# Patient Record
Sex: MALE | Race: WHITE | Employment: UNEMPLOYED | URBAN - NONMETROPOLITAN AREA
[De-identification: names, ages, dates, MRNs, and addresses within clinical notes are randomized per-mention and may not be internally consistent; named-entity substitution may affect disease eponyms.]

---

## 2017-11-22 ENCOUNTER — HOSPITAL ENCOUNTER (OUTPATIENT)
Age: 32
Discharge: HOME | End: 2017-11-22
Payer: MEDICAID

## 2017-11-22 LAB
ALBUMIN SERPL-MCNC: 4.8 G/DL (ref 3.5–5.2)
ALBUMIN/GLOBULIN RATIO: 1.5 (ref 1–2.5)
ALP BLD-CCNC: 142 U/L (ref 40–129)
ALT SERPL-CCNC: 26 U/L (ref 5–41)
AST SERPL-CCNC: 19 U/L
BILIRUB SERPL-MCNC: 0.18 MG/DL (ref 0.3–1.2)
BILIRUBIN DIRECT: <0.08 MG/DL
BILIRUBIN, INDIRECT: ABNORMAL MG/DL (ref 0–1)
GLOBULIN: ABNORMAL G/DL (ref 1.5–3.8)
HAV IGM SER IA-ACNC: NONREACTIVE
HBV SURFACE AB TITR SER: 495 MIU/ML
HEPATITIS B CORE IGM ANTIBODY: NONREACTIVE
HEPATITIS B SURFACE ANTIGEN: NONREACTIVE
HEPATITIS C ANTIBODY: REACTIVE
HIV AG/AB: NONREACTIVE
TOTAL PROTEIN: 8 G/DL (ref 6.4–8.3)

## 2017-11-22 PROCEDURE — 87340 HEPATITIS B SURFACE AG IA: CPT

## 2017-11-22 PROCEDURE — 86317 IMMUNOASSAY INFECTIOUS AGENT: CPT

## 2017-11-22 PROCEDURE — 36415 COLL VENOUS BLD VENIPUNCTURE: CPT

## 2017-11-22 PROCEDURE — 87389 HIV-1 AG W/HIV-1&-2 AB AG IA: CPT

## 2017-11-22 PROCEDURE — 86709 HEPATITIS A IGM ANTIBODY: CPT

## 2017-11-22 PROCEDURE — 80076 HEPATIC FUNCTION PANEL: CPT

## 2017-11-22 PROCEDURE — 86803 HEPATITIS C AB TEST: CPT

## 2017-11-22 PROCEDURE — 86705 HEP B CORE ANTIBODY IGM: CPT

## 2018-10-15 ENCOUNTER — HOSPITAL ENCOUNTER (OUTPATIENT)
Age: 33
Discharge: HOME | End: 2018-10-15
Payer: MEDICAID

## 2018-10-15 LAB — HEPATITIS C ANTIBODY: REACTIVE

## 2018-10-15 PROCEDURE — 36415 COLL VENOUS BLD VENIPUNCTURE: CPT

## 2018-10-15 PROCEDURE — 87522 HEPATITIS C REVRS TRNSCRPJ: CPT

## 2018-10-15 PROCEDURE — 86803 HEPATITIS C AB TEST: CPT

## 2018-10-16 LAB
DIRECT EXAM: ABNORMAL
Lab: ABNORMAL
SPECIMEN DESCRIPTION: ABNORMAL
STATUS: ABNORMAL

## 2020-03-10 ENCOUNTER — HOSPITAL ENCOUNTER (OUTPATIENT)
Dept: LAB | Age: 35
Discharge: HOME | End: 2020-03-10
Payer: MEDICAID

## 2020-03-10 LAB
ABSOLUTE EOS #: 0.04 K/UL (ref 0–0.44)
ABSOLUTE IMMATURE GRANULOCYTE: <0.03 K/UL (ref 0–0.3)
ABSOLUTE LYMPH #: 1.34 K/UL (ref 1.1–3.7)
ABSOLUTE MONO #: 0.35 K/UL (ref 0.1–1.2)
ALBUMIN SERPL-MCNC: 4.8 G/DL (ref 3.5–5.2)
ALBUMIN/GLOBULIN RATIO: 1.5 (ref 1–2.5)
ALP BLD-CCNC: 114 U/L (ref 40–129)
ALT SERPL-CCNC: 135 U/L (ref 5–41)
ANION GAP SERPL CALCULATED.3IONS-SCNC: 9 MMOL/L (ref 9–17)
AST SERPL-CCNC: 60 U/L
BASOPHILS # BLD: 0 % (ref 0–2)
BASOPHILS ABSOLUTE: <0.03 K/UL (ref 0–0.2)
BILIRUB SERPL-MCNC: 0.55 MG/DL (ref 0.3–1.2)
BUN BLDV-MCNC: 13 MG/DL (ref 6–20)
BUN/CREAT BLD: 16 (ref 9–20)
CALCIUM SERPL-MCNC: 9.4 MG/DL (ref 8.6–10.4)
CHLORIDE BLD-SCNC: 102 MMOL/L (ref 98–107)
CHOLESTEROL/HDL RATIO: 3.3
CHOLESTEROL: 147 MG/DL
CO2: 27 MMOL/L (ref 20–31)
CREAT SERPL-MCNC: 0.79 MG/DL (ref 0.7–1.2)
DIFFERENTIAL TYPE: ABNORMAL
EOSINOPHILS RELATIVE PERCENT: 1 % (ref 1–4)
GFR AFRICAN AMERICAN: >60 ML/MIN
GFR NON-AFRICAN AMERICAN: >60 ML/MIN
GFR SERPL CREATININE-BSD FRML MDRD: ABNORMAL ML/MIN/{1.73_M2}
GFR SERPL CREATININE-BSD FRML MDRD: ABNORMAL ML/MIN/{1.73_M2}
GLUCOSE BLD-MCNC: 97 MG/DL (ref 70–99)
HAV AB SERPL IA-ACNC: REACTIVE
HBV SURFACE AB TITR SER: 75.13 MIU/ML
HCT VFR BLD CALC: 39.9 % (ref 40.7–50.3)
HDLC SERPL-MCNC: 45 MG/DL
HEMOGLOBIN: 13.8 G/DL (ref 13–17)
HEPATITIS B CORE TOTAL ANTIBODY: REACTIVE
HEPATITIS B SURFACE ANTIGEN: NONREACTIVE
IMMATURE GRANULOCYTES: 0 %
INR BLD: 1.1 (ref 0.9–1.2)
LDL CHOLESTEROL: 89 MG/DL (ref 0–130)
LYMPHOCYTES # BLD: 30 % (ref 24–43)
MCH RBC QN AUTO: 29.7 PG (ref 25.2–33.5)
MCHC RBC AUTO-ENTMCNC: 34.6 G/DL (ref 28.4–34.8)
MCV RBC AUTO: 86 FL (ref 82.6–102.9)
MONOCYTES # BLD: 8 % (ref 3–12)
NRBC AUTOMATED: 0 PER 100 WBC
PDW BLD-RTO: 12.4 % (ref 11.8–14.4)
PLATELET # BLD: 149 K/UL (ref 138–453)
PLATELET ESTIMATE: ABNORMAL
PMV BLD AUTO: 8.7 FL (ref 8.1–13.5)
POTASSIUM SERPL-SCNC: 4.3 MMOL/L (ref 3.7–5.3)
PROTHROMBIN TIME: 10.8 SEC (ref 9.7–12.2)
RBC # BLD: 4.64 M/UL (ref 4.21–5.77)
RBC # BLD: ABNORMAL 10*6/UL
SEG NEUTROPHILS: 61 % (ref 36–65)
SEGMENTED NEUTROPHILS ABSOLUTE COUNT: 2.74 K/UL (ref 1.5–8.1)
SODIUM BLD-SCNC: 138 MMOL/L (ref 135–144)
TOTAL PROTEIN: 7.9 G/DL (ref 6.4–8.3)
TRIGL SERPL-MCNC: 66 MG/DL
TSH SERPL DL<=0.05 MIU/L-ACNC: 0.8 MIU/L (ref 0.3–5)
VLDLC SERPL CALC-MCNC: NORMAL MG/DL (ref 1–30)
WBC # BLD: 4.5 K/UL (ref 3.5–11.3)
WBC # BLD: ABNORMAL 10*3/UL

## 2020-03-10 PROCEDURE — 36415 COLL VENOUS BLD VENIPUNCTURE: CPT

## 2020-03-10 PROCEDURE — 85025 COMPLETE CBC W/AUTO DIFF WBC: CPT

## 2020-03-10 PROCEDURE — 87340 HEPATITIS B SURFACE AG IA: CPT

## 2020-03-10 PROCEDURE — 86317 IMMUNOASSAY INFECTIOUS AGENT: CPT

## 2020-03-10 PROCEDURE — 87389 HIV-1 AG W/HIV-1&-2 AB AG IA: CPT

## 2020-03-10 PROCEDURE — 87902 NFCT AGT GNTYP ALYS HEP C: CPT

## 2020-03-10 PROCEDURE — 86708 HEPATITIS A ANTIBODY: CPT

## 2020-03-10 PROCEDURE — 86704 HEP B CORE ANTIBODY TOTAL: CPT

## 2020-03-10 PROCEDURE — 80053 COMPREHEN METABOLIC PANEL: CPT

## 2020-03-10 PROCEDURE — 84443 ASSAY THYROID STIM HORMONE: CPT

## 2020-03-10 PROCEDURE — 85610 PROTHROMBIN TIME: CPT

## 2020-03-10 PROCEDURE — 80061 LIPID PANEL: CPT

## 2020-03-10 PROCEDURE — 87522 HEPATITIS C REVRS TRNSCRPJ: CPT

## 2020-03-12 LAB
DIRECT EXAM: ABNORMAL
DIRECT EXAM: ABNORMAL
Lab: ABNORMAL
SPECIMEN DESCRIPTION: ABNORMAL

## 2020-03-13 LAB
HCV QUANTITATIVE: NORMAL
HEPATITIS C GENOTYPE: NORMAL
HIV AG/AB: NONREACTIVE

## 2020-05-26 ENCOUNTER — HOSPITAL ENCOUNTER (OUTPATIENT)
Age: 35
Discharge: HOME | End: 2020-05-26
Payer: MEDICAID

## 2020-05-26 PROCEDURE — 82977 ASSAY OF GGT: CPT

## 2020-05-26 PROCEDURE — 84460 ALANINE AMINO (ALT) (SGPT): CPT

## 2020-05-26 PROCEDURE — 83883 ASSAY NEPHELOMETRY NOT SPEC: CPT

## 2020-05-26 PROCEDURE — 84450 TRANSFERASE (AST) (SGOT): CPT

## 2020-05-26 PROCEDURE — 36415 COLL VENOUS BLD VENIPUNCTURE: CPT

## 2020-05-26 PROCEDURE — 84520 ASSAY OF UREA NITROGEN: CPT

## 2020-05-28 LAB
ALANINE AMINOTRANSFERASE, FIBROMETER: 163 U/L (ref 5–50)
ALPHA-2-MACROGLOBULIN, FIBROMETER: 123 MG/DL (ref 131–293)
ASPARTATE AMINOTRANSFERASE, FIBROMETER: 77 U/L (ref 9–50)
CIRRHOMETER PATIENT SCORE: 0.03
EER FIBROMETER REPORT: ABNORMAL
FIBROMETER INTERPRETATION: ABNORMAL
FIBROMETER PATIENT SCORE: 0.39
FIBROMETER PLATELET COUNT: 133
FIBROMETER PROTHROMBIN INDEX: 91 % (ref 90–120)
FIBROSIS METAVIR CLASSIFICATION: ABNORMAL
GAMMA GLUTAMYL TRANSFERASE, FIBROMETER: 21 U/L (ref 7–51)
INFLAMETER METAVIR CLASSIFICATION: ABNORMAL
INFLAMETER PATIENT SCORE: 0.67
UREA NITROGEN, FIBROMETER: 16 MG/DL (ref 7–20)

## 2020-06-18 ENCOUNTER — OFFICE VISIT (OUTPATIENT)
Dept: OTOLARYNGOLOGY | Age: 35
End: 2020-06-18
Payer: MEDICAID

## 2020-06-18 VITALS
TEMPERATURE: 98.4 F | HEART RATE: 71 BPM | HEIGHT: 66 IN | BODY MASS INDEX: 27 KG/M2 | DIASTOLIC BLOOD PRESSURE: 78 MMHG | WEIGHT: 168 LBS | SYSTOLIC BLOOD PRESSURE: 116 MMHG

## 2020-06-18 PROBLEM — R42 DIZZINESS: Status: ACTIVE | Noted: 2020-06-18

## 2020-06-18 PROBLEM — H93.8X2 PRESSURE SENSATION IN LEFT EAR: Status: ACTIVE | Noted: 2020-06-18

## 2020-06-18 PROBLEM — R42 LIGHTHEADEDNESS: Status: ACTIVE | Noted: 2020-06-18

## 2020-06-18 PROCEDURE — 99204 OFFICE O/P NEW MOD 45 MIN: CPT | Performed by: PHYSICIAN ASSISTANT

## 2020-06-18 PROCEDURE — 1036F TOBACCO NON-USER: CPT | Performed by: PHYSICIAN ASSISTANT

## 2020-06-18 PROCEDURE — G8427 DOCREV CUR MEDS BY ELIG CLIN: HCPCS | Performed by: PHYSICIAN ASSISTANT

## 2020-06-18 PROCEDURE — G8419 CALC BMI OUT NRM PARAM NOF/U: HCPCS | Performed by: PHYSICIAN ASSISTANT

## 2020-06-18 RX ORDER — BUPRENORPHINE AND NALOXONE 12; 3 MG/1; MG/1
1 FILM, SOLUBLE BUCCAL; SUBLINGUAL DAILY
COMMUNITY
Start: 2020-03-05

## 2020-06-18 RX ORDER — ESCITALOPRAM OXALATE 20 MG/1
20 TABLET ORAL DAILY
COMMUNITY
Start: 2020-06-15

## 2020-06-18 RX ORDER — BUPROPION HYDROCHLORIDE 150 MG/1
TABLET, EXTENDED RELEASE ORAL
COMMUNITY
Start: 2020-03-05 | End: 2020-11-11 | Stop reason: SDUPTHER

## 2020-06-18 ASSESSMENT — ENCOUNTER SYMPTOMS
APNEA: 0
SINUS PAIN: 0
RECTAL PAIN: 0
SINUS PRESSURE: 0
DIARRHEA: 0
SHORTNESS OF BREATH: 0
SORE THROAT: 0
NAUSEA: 0
EYE ITCHING: 0
EYE DISCHARGE: 0
BACK PAIN: 0
FACIAL SWELLING: 0
ABDOMINAL PAIN: 0
CONSTIPATION: 0
EYE PAIN: 0
WHEEZING: 0
CHOKING: 0
ANAL BLEEDING: 0
TROUBLE SWALLOWING: 0
VOICE CHANGE: 0
EYE REDNESS: 0
RHINORRHEA: 0
BLOOD IN STOOL: 0
ABDOMINAL DISTENTION: 0
PHOTOPHOBIA: 0
STRIDOR: 0
VOMITING: 0
COLOR CHANGE: 0
COUGH: 0
CHEST TIGHTNESS: 0

## 2020-06-18 NOTE — PROGRESS NOTES
[facility] [address] [place] [facility] OF [facility]  100 [facility]. [address] [place] [address]  Dept: [phone]   HANNAH Syed MD (supervising physician)    Fatimah Jennings 30 y.o. male     Patient presents with a chief complaint of Dizziness (States, \"everytime I move too fast I get really dizzy. \"  Denies nausea or vomiting and no syncope.)       /78 (Site: Left Upper Arm, Position: Sitting, Cuff Size: Medium Adult)   Pulse 71   Temp 98.4 °F (36.9 °C) (Temporal)   Ht 5' 6\" (1.676 m)   Wt 168 lb (76.2 kg)   BMI 27.12 kg/m²       History of Presenting Illness: The patient/caregiver reports a history of complaint with the following features: Onset/Quality:  Onset a \"couple months\" ago. Clarifies as March 2020. Denies illness or injury or change in medications at that time. Hasn't had anything like this occur before. When turns head quickly (says he thinks or guesses just his head versus body and head movement). Says almost like you \"snap blackout\" and also described as a quick lightheaded spell. Lasts 1-2 seconds. Doesn't matter the direction that he moves his head. Can occur with any direction. He also notes it happens when standing and not when sitting. Spinning/vertigo? Pt says \"um it's kind of like that but I don't know if I would describe it like that\". Sometimes feels like something in left ear as well. Doesn't necessarily correlate with the dizziness. Doesn't seem to be related to the dizziness per Pt. Ear pressure lasts x 1 hour. Occurs almost daily, but not daily. Occurs most days. Timing:   No worse during a particular time of day. Always when standing up and doesn't seem to occur if sitting. No problems rolling over in bed. Frequency:  Several times a day. Duration:  The dizziness/lightheadedness lasts for 1-2 seconds. Severity:   Says \"not a huge deal\". \"Just irritating\".   No associated taking his time to move. Aggravating factors:  Quick head movements but has to be standing up and not sitting. Doesn't seem to happen when sitting. Review of systems covering 10 systems is reviewed as staff entry in other note and pertinent positives and negatives noted. History reviewed. No pertinent past medical history.     Current Outpatient Medications:     escitalopram (LEXAPRO) 20 MG tablet, , Disp: , Rfl:     buprenorphine-naloxone (SUBOXONE) 12-3 MG sublingual film, Buprenorphine / Naloxone Suboxone 12-3 MG Sublingual Film 03/05/2020 Provider: 03-  [facility] 87 ([id number]), Disp: , Rfl:     buPROPion (WELLBUTRIN SR) 150 MG extended release tablet, buPROPion Wellbutrin  MG Oral Tablet Extended Release 12 Hour 03/05/2020 Provider: 03-  [facility] 87 ([id number]), Disp: , Rfl:    No Known Allergies   Past Surgical History:   Procedure Laterality Date    ADENOIDECTOMY        Social History     Socioeconomic History    Marital status: Single     Spouse name: Not on file    Number of children: Not on file    Years of education: Not on file    Highest education level: Not on file   Occupational History    Not on file   Social Needs    Financial resource strain: Not on file    Food insecurity     Worry: Not on file     Inability: Not on file    Transportation needs     Medical: Not on file     Non-medical: Not on file   Tobacco Use    Smoking status: Former Smoker     Packs/day: 1.00    Smokeless tobacco: Never Used   Substance and Sexual Activity    Alcohol use: No    Drug use: Yes     Comment: suboxone, speed    Sexual activity: Yes     Partners: Female   Lifestyle    Physical activity     Days per week: Not on file     Minutes per session: Not on file    Stress: Not on file   Relationships    Social connections     Talks on phone: Not on file     Gets together: Not on file     Attends [employer] service: Not on file     Active member of discharge    Right Tympanic Membrane: normal landmarks, translucent, mobile to pneumatic otoscopy, no perforation, no effusion, no redness or injection, TM not bulging or retracted, middle ear space appears adequately ventilated    Left Tympanic Membrane: normal landmarks, translucent, mobile to pneumatic otoscopy, no perforation, no effusion, no redness or injection, TM not bulging or retracted, middle ear space appears adequately ventilated    Hearing: intact to spoken voice, intact to finger rub bilaterally, Snider lateralizes to the left, Right Ear: Rinne AC>BC, Left Ear: Rinne AC>BC    NOSE:    Nasal Skin: no lesions, no lacerations, no scars    Nasal Dorsum: symmetric with no visible or palpable deformities    Nasal Tip: normal symmetric nasal tip, normal nasal valves    Nasal Mucosa: normal, pink and moist, no polyps, no drainage    Septum:  appears bowed to the right when viewed from left nasal cavity but with prominent ledge jutting out on left septum, no exposed vessels, no bleeding, no septal granuloma, no perforation    Turbinates: normal size and conformation, no swelling    ORAL CAVITY/MOUTH:    Lips, teeth, gums: normal lips, normal gums, dentition intact with some decay of bilateral lower molars    Oral Mucosa: normal, moist, no lesions    Palate: normal hard palate, normal soft palate, symmetric palatal elevation    Floor of Mouth: normal floor of mouth    Tongue: normal tongue, no lesions, no edema, no masses, normal mucosa, mobile    Tonsils:  1+ bilateral tonsils, symmetrical, normal appearing, no masses or lesions, no exudate, no redness    Posterior pharynx: normally formed, some redness in areas, no exudate, no masses or lesions    NECK:    Neck: no masses, trachea midline, normal range of motion, no cysts or pits, no tenderness to palpation  Full AROM with neck extension  Full AROM with neck flexion  Full AROM with neck rotation bilaterally  Full AROM with neck with lateral flexion to time, oriented to place, oriented to person, oriented to situation  Thought processes logical    Cranial Nerves: Cranial Nerves II-XII intact, normal facial movement, normal shoulder shrug (against resistance)    Normal gait, not ataxic or antalgic  Essentially unremarkable tandem gait. No problems with tandem walk except with one step but no significant displacement of feet or notable/significant imbalance. Negative Romberg test.  Heel up and down shin no problems bilaterally. Normal finger-to-nose pointing bilaterally with eyes open. With eyes closed when finger-to-nose pointing with right hand was on target most times, but sometimes just off to the side of target but not by much. Later repeated this and no problems and was on target. No problems when using left hand with finger-to-nose pointing with eyes closed. Finger-to-nose with moving target - no difficulty bilaterally. Vestibulocular: Issa-Hallpike testing negative for symptoms or nystagmus, no ocular pursuit defects, and no head-shaking test induced nystagmus  Fukuda step test essentially normal - no rotation with 30 steps, on the 6th step Pt with mild imbalance backwards but continued to complete 30 steps without further imbalance    PSYCHIATRIC:    Mood and affect:  Affect appropriate for situation/setting. Assessment and Plan:  I have advised the patient and/or caregiver that the symptoms and exam findings are most consistent with dizziness of uncertain cause. We have discussed that the symptoms can arise from abnormalities of the balance organ of the ear, vision impairment, poor coordination of movements by the brain, weakness or reduced sensation of the extremities, or other causes related to metabolic abnormalities or circulatory problems. We have discussed that other evaluation may be needed and that ongoing follow-up is recommended.   The patient and/or caregiver is to notify the office if no improvement or worsening of

## 2020-06-19 PROBLEM — J34.2 DEVIATED NASAL SEPTUM: Status: ACTIVE | Noted: 2020-06-19

## 2020-06-19 PROBLEM — H93.12 TINNITUS OF LEFT EAR: Status: ACTIVE | Noted: 2020-06-19

## 2020-07-09 ENCOUNTER — OFFICE VISIT (OUTPATIENT)
Dept: OTOLARYNGOLOGY | Age: 35
End: 2020-07-09
Payer: MEDICAID

## 2020-07-09 VITALS
HEART RATE: 76 BPM | WEIGHT: 168.1 LBS | OXYGEN SATURATION: 98 % | RESPIRATION RATE: 16 BRPM | DIASTOLIC BLOOD PRESSURE: 77 MMHG | BODY MASS INDEX: 27.02 KG/M2 | SYSTOLIC BLOOD PRESSURE: 134 MMHG | HEIGHT: 66 IN | TEMPERATURE: 97.4 F

## 2020-07-09 PROCEDURE — G8428 CUR MEDS NOT DOCUMENT: HCPCS | Performed by: PHYSICIAN ASSISTANT

## 2020-07-09 PROCEDURE — 1036F TOBACCO NON-USER: CPT | Performed by: PHYSICIAN ASSISTANT

## 2020-07-09 PROCEDURE — G8419 CALC BMI OUT NRM PARAM NOF/U: HCPCS | Performed by: PHYSICIAN ASSISTANT

## 2020-07-09 PROCEDURE — 99213 OFFICE O/P EST LOW 20 MIN: CPT | Performed by: PHYSICIAN ASSISTANT

## 2020-07-09 ASSESSMENT — ENCOUNTER SYMPTOMS
CHEST TIGHTNESS: 0
COUGH: 0
VOMITING: 0
SINUS PRESSURE: 0
EYE ITCHING: 0
ABDOMINAL DISTENTION: 0
TROUBLE SWALLOWING: 0
EYE PAIN: 0
COLOR CHANGE: 0
CHOKING: 0
PHOTOPHOBIA: 0
WHEEZING: 0
NAUSEA: 0
STRIDOR: 0
CONSTIPATION: 0
SORE THROAT: 0
VOICE CHANGE: 0
RHINORRHEA: 0
BACK PAIN: 0
EYE REDNESS: 0
ANAL BLEEDING: 0
APNEA: 0
BLOOD IN STOOL: 0
ABDOMINAL PAIN: 0
FACIAL SWELLING: 0
RECTAL PAIN: 0
EYE DISCHARGE: 0
SINUS PAIN: 0
SHORTNESS OF BREATH: 0
DIARRHEA: 0

## 2020-07-09 NOTE — PROGRESS NOTES
[facility] [address] [facility] PART OF [facility]  100 [facility]. [address] [place] [address]  Dept: [phone]   HANNAH Huff MD (supervising physician)    Brandy Brock 30 y.o. male     Patient presents with a chief complaint of Dizziness (States, \"nothing has changed I still have dizziness mostly when sitting upright and I have random pressure in my head on the left side. \")       /77 (Site: Left Upper Arm, Position: Sitting, Cuff Size: Medium Adult)   Pulse 76   Temp 97.4 °F (36.3 °C) (Infrared)   Resp 16   Ht 5' 6\" (1.676 m)   Wt 168 lb 1.6 oz (76.2 kg)   SpO2 98%   BMI 27.13 kg/m²            Orthostatic measurements from today:  Lying for 5 minutes  Right arm  /80  HR 68-69    Standing at 1 minute  Right arm  /77  HR 76  Denied symptoms. Standing at 3 minutes  Right arm  /76  HR 82  Denied symptoms. HPI from today:  Pt is here to follow-up after seeing audiologist for formal hearing evaluation. Records of this testing are in the \"Media\" section of EMR and no abnormality on that testing (normal audiometry, normal tympanometry, normal acoustic reflexes, no acoustic reflex decay, excellent speech discrimination bilaterally). Denies changes since last visit. Nothing better or worse and denies new symptoms. HPI from last visit reviewed with Pt. No dizziness with sitting, just standing. Episodes of the dizziness daily. Duration of episodes is 1-2 seconds. Quality of dizziness episodes is described as an \"instant loss of equilibrium\" and also as lightheadedness. Denies spinning/vertigo. Denies LOC. Denies loss of vision or vision going black. Denies chest pain, racing heart, too slow of a heart rate, and heart palpitations. Denies neck pain and neck stiffness. Denies neck problems in general.    Sometimes feels like there is something in the left ear. Doesn't feel this way currently.   Nothing makes this worse/brings it on. Denies ear pain, but Hx of \"earaches\". Last ear ache last summer. Denies ear drainage. Denies jaw pain or crepitus. At last visit reported clenching of teeth. No associated falls. No problems driving. Normal TSH and no anemia on labs from March 2020. No personal Hx of seizures. No family Hx of seizures. Denies familial heart disease. Family medical Hx reviewed with Pt. Mom - scoliosis. Dad - healthy. 4 half-siblings. No medical problems that Pt knows of for his half-siblings. Paternal GM - emphysema, smoker. Paternal GF - not known. Maternal GM - dementia  Maternal GF - healthy. History of Presenting Illness from last visit on 6/18/20 (was a new Pt at that time): The patient/caregiver reports a history of complaint with the following features: Onset/Quality:  Onset a \"couple months\" ago. Clarifies as March 2020. Denies illness or injury or change in medications at that time. Hasn't had anything like this occur before. When turns head quickly (says he thinks or guesses just his head versus body and head movement). Says almost like you \"snap blackout\" and also described as a quick lightheaded spell. Lasts 1-2 seconds. Doesn't matter the direction that he moves his head. Can occur with any direction. He also notes it happens when standing and not when sitting. Spinning/vertigo? Pt says \"um it's kind of like that but I don't know if I would describe it like that\". Sometimes feels like something in left ear as well. Doesn't necessarily correlate with the dizziness. Doesn't seem to be related to the dizziness per Pt. Ear pressure lasts x 1 hour. Occurs almost daily, but not daily. Occurs most days. Timing:   No worse during a particular time of day. Always when standing up and doesn't seem to occur if sitting. No problems rolling over in bed. Frequency:  Several times a day.   Duration:  The dizziness/lightheadedness lasts meclizine. Took x 2 weeks with no change. Alleviating factors:  Not moving too quickly and taking his time to move. Aggravating factors:  Quick head movements but has to be standing up and not sitting. Doesn't seem to happen when sitting. End of HPI from 6/18/20    Review of systems covering 10 systems is reviewed as staff entry in other note and pertinent positives and negatives noted. History reviewed. No pertinent past medical history. Current Outpatient Medications   Medication Sig Dispense Refill    escitalopram (LEXAPRO) 20 MG tablet       buprenorphine-naloxone (SUBOXONE) 12-3 MG sublingual film Buprenorphine / Naloxone Suboxone 12-3 MG Sublingual Film 03/05/2020 Provider: 03-  [facility] 87 ([id number])      buPROPion [facility] SR) 150 MG extended release tablet buPROPion Wellbutrin  MG Oral Tablet Extended Release 12 Hour 03/05/2020 Provider: 03-  [facility] 87 ([id number])       No current facility-administered medications for this visit.           No Known Allergies   Past Surgical History:   Procedure Laterality Date    ADENOIDECTOMY        Social History     Socioeconomic History    Marital status: Single     Spouse name: Not on file    Number of children: Not on file    Years of education: Not on file    Highest education level: Not on file   Occupational History    Not on file   Social Needs    Financial resource strain: Not on file    Food insecurity     Worry: Not on file     Inability: Not on file    Transportation needs     Medical: Not on file     Non-medical: Not on file   Tobacco Use    Smoking status: Former Smoker     Packs/day: 1.00    Smokeless tobacco: Never Used   Substance and Sexual Activity    Alcohol use: No    Drug use: Yes     Comment: suboxone, speed    Sexual activity: Yes     Partners: Female   Lifestyle    Physical activity     Days per week: Not on file     Minutes per session: Not on file    Stress: Not on file   Relationships    Social connections     Talks on phone: Not on file     Gets together: Not on file     Attends [employer] service: Not on file     Active member of club or organization: Not on file     Attends meetings of clubs or organizations: Not on file     Relationship status: Not on file    Intimate partner violence     Fear of current or ex partner: Not on file     Emotionally abused: Not on file     Physically abused: Not on file     Forced sexual activity: Not on file   Other Topics Concern    Not on file   Social History Narrative    Not on file     History reviewed. No pertinent family history.      PHYSICAL EXAM:    The patient was examined today 7/09/2020 with findings as follows:    CONSTITUTIONAL:    General Appearance: well-appearing, nontoxic, alert, no acute distress     Communication: understanding at normal conversational tones, normal voicing, speech intelligible    HEAD/FACE:    Head: atraumatic, normocephalic, no lesions    Facial Inspection: no lesions, healthy skin    Facial Strength: motor strength normal, symmetric strength, symmetric movement    Sinuses: no sinus tenderness    Salivary Glands: no enlargement of parotid glands, no tenderness of parotid glands, no masses of parotid glands, no enlargement of submandibular glands, no tenderness of submandibular glands, no masses of submandibular glands    Temporomandibular Joint: no crepitus with motion, no tenderness on palpation, no trismus, motion symmetric    EYES:  PERRL, extra-ocular movements intact, no nystagmus, sclera white, no redness of eyes, no watering of eyes    EARS:    Bilateral External Ears: no pits, no tags    Right External Ear: normally formed, no lesions; no mastoid tenderness, redness or swelling    Left External Ear: normally formed, no lesions; no mastoid tenderness, redness or swelling    Right External Auditory Canal: normally formed, no redness, no swelling, no lesions, healthy skin, no obstructing enlargement    RESPIRATORY:    Inspection/Auscultation: good air movement (anteriorly, posteriorly, laterally), chest expands symmetrically, normal breath sounds, no wheezing, no stridor, no rhonchi, no crackles    CARDIOVASCULAR SYSTEM:  Heart regular rate and rhythm, normal S1 and S2, no m/r/g  No carotid thrills, no carotid bruits, no JVD    Observation/Palpation of Peripheral Vascular System: no varicosities, no cyanosis, no edema    MUSCULOSKELETAL SYSTEM:    Musculoskeletal System:  Moves all 4 extremities. No gross motor deficit or gross deformity of extremities. Ambulates on own. Gait is not ataxic or antalgic. SKIN:    General Appearance: no rash of exposed skin, warm and dry, no bruising or petechiae/purpura of exposed skin    NEUROLOGICAL SYSTEM:    Orientation: oriented to time, oriented to place, oriented to person, oriented to situation  Thought processes logical    Cranial Nerves: Cranial Nerves II-XII intact, normal facial movement    PSYCHIATRIC:    Mood and affect:  Affect appropriate for situation/setting. Assessment and Plan:  Unsure of cause of Pt's symptoms. Possible orthostatic hypotension or other autonomic dysfunction. Symptoms only with standing per Pt. His \"dizziness\" complaints are likely not related to his ear/vestibular system. Considered and discussed that CNS imaging, ECG, tilt table testing, referral to cardiologist, cardiac monitoring, referral to neurologist, and EEG may be indicated. I initiated an order for tilt table testing today. Pt also advised to follow back up with PCP and that I will call with tilt table testing results. Discussed aiming for at least 2 liters of water or low calorie fluid per day. Discussed considering trial of compression stockings. Pt indicates he does eat salty foods and believes he is getting adequate salt intake. Believes he is getting at least 2000 mg a day.     Unsure of the etiology of the intermittent left ear pressure complaints. Will continue to monitor. Normal recent audiology evaluation. Pt denies having active ear fullness/pressure while at today's visit. Considered symptoms may be referred from elsewhere to ear and discussed this with Pt. Pt has reported clenching of teeth and considered referred from the TMJ. Denies jaw pain and TMJ crepitus and exam of this area unremarkable today. Follow-up in 3 months. Differential:  Autonomic dysfunction (orthostatic hypotension, POTS), hypovolemia, cardiac dysrhythmia, medication side effect, adrenal insufficiency    The patient and/or caregiver is to notify the office if no improvement or worsening of symptoms is noted prior to the scheduled follow-up for sooner evaluation. The patient and/or caregiver is able to state an understanding of these recommendations and is agreeable to the treatment plan. Diagnosis Orders   1. Dizziness  Tilt Table Test   2. Lightheadedness  Tilt Table Test   3. Orthostatic hypotension  Tilt Table Test    Possible orthostatic hypotension. At 1 min after standing SBP drop of > 20 mmHg, but at 3 minutes SBP about same as it was when lying. 4. Pressure sensation in left ear     5. Tinnitus of left ear        Return in about 3 months (around 10/9/2020).

## 2020-07-15 ENCOUNTER — HOSPITAL ENCOUNTER (EMERGENCY)
Age: 35
Discharge: HOME | End: 2020-07-15
Payer: MEDICAID

## 2020-07-15 ENCOUNTER — HOSPITAL ENCOUNTER (OUTPATIENT)
Dept: NON INVASIVE DIAGNOSTICS | Age: 35
Discharge: HOME | End: 2020-07-15
Payer: MEDICAID

## 2020-07-15 PROCEDURE — 99282 EMERGENCY DEPT VISIT SF MDM: CPT

## 2020-07-15 RX ORDER — NAPROXEN 500 MG/1
500 TABLET ORAL 2 TIMES DAILY
Qty: 14 TABLET | Refills: 0 | Status: SHIPPED | OUTPATIENT
Start: 2020-07-15 | End: 2020-09-21

## 2020-07-15 RX ORDER — MECLIZINE HYDROCHLORIDE 25 MG/1
25 TABLET ORAL 3 TIMES DAILY PRN
Qty: 15 TABLET | Refills: 0 | Status: SHIPPED | OUTPATIENT
Start: 2020-07-15 | End: 2020-07-25

## 2020-07-15 ASSESSMENT — PAIN DESCRIPTION - ORIENTATION: ORIENTATION: LEFT

## 2020-07-15 ASSESSMENT — PAIN SCALES - GENERAL: PAINLEVEL_OUTOF10: 7

## 2020-07-15 ASSESSMENT — PAIN DESCRIPTION - DESCRIPTORS: DESCRIPTORS: PRESSURE;SHARP

## 2020-07-15 ASSESSMENT — PAIN DESCRIPTION - LOCATION: LOCATION: EAR

## 2020-07-15 NOTE — PROGRESS NOTES
Six attempts made by myself and Lucina Ramirez RN  to gain IV access. Pt. Relates he is was an IV drug abuser for many years and it's rare to get an IV in now. Unable to do tilt test today due to no IV access. Patient verb. Understanding.

## 2020-07-15 NOTE — ED PROVIDER NOTES
[facility] ED  EMERGENCY DEPARTMENT ENCOUNTER      Pt Name: Xena Moore  MRN: [id number]  [place] 1985  Date of evaluation: 7/15/2020  Provider: Hermila Webster PA-C    CHIEF COMPLAINT       Chief Complaint   Patient presents with    Otalgia     Left ear pain, reports being seen by ENT recently. States began as equilibrium issue, pain onset was this morning. HISTORY OF PRESENT ILLNESS    Xena Moore is a 30 y.o. male who presents to the emergency department from home with pain in his left ear and equilibrium issues going on for the past several months. He has seen ENT twice for ear pressure and intermittent episodes of dizziness he states sometimes when he moves his head he feels like everything shifts or moves. This could not be reproduced at the ENTs office, he stated that they were going to refer him for possible imaging or neurology referral but they have not done this yet. He was supposed to get a tilt table test here at the hospital but could not have it done today so he came to the emergency department stating the pressure has been in his ear is becoming more intense and painful. Triage notes and Nursing notes were reviewed by myself. Any discrepancies are addressed above. PAST MEDICAL HISTORY   History reviewed. No pertinent past medical history.     SURGICAL HISTORY       Past Surgical History:   Procedure Laterality Date    ADENOIDECTOMY         CURRENT MEDICATIONS       Discharge Medication List as of 7/15/2020  1:14 PM      CONTINUE these medications which have NOT CHANGED    Details   escitalopram (LEXAPRO) 20 MG tablet Historical Med      buprenorphine-naloxone (SUBOXONE) 12-3 MG sublingual film Buprenorphine / Naloxone Suboxone 12-3 MG Sublingual Film 03/05/2020 Provider: 03-  [facility] 87 ([id number])Historical Med      buPROPion [facility] SR) 150 MG extended release tablet buPROPion Wellbutrin  MG Oral Tablet Extended Release 12 Hour 03/05/2020 Provider: 03-  [facility] 87 ([id number])Historical Med             ALLERGIES     Patient has no known allergies. FAMILY HISTORY     History reviewed. No pertinent family history. SOCIAL HISTORY       Social History     Socioeconomic History    Marital status: Single     Spouse name: None    Number of children: None    Years of education: None    Highest education level: None   Occupational History    None   Social Needs    Financial resource strain: None    Food insecurity     Worry: None     Inability: None    Transportation needs     Medical: None     Non-medical: None   Tobacco Use    Smoking status: Former Smoker     Packs/day: 1.00    Smokeless tobacco: Never Used   Substance and Sexual Activity    Alcohol use: No    Drug use: Yes     Comment: suboxone, speed    Sexual activity: Yes     Partners: Female   Lifestyle    Physical activity     Days per week: None     Minutes per session: None    Stress: None   Relationships    Social connections     Talks on phone: None     Gets together: None     Attends Jewish service: None     Active member of club or organization: None     Attends meetings of clubs or organizations: None     Relationship status: None    Intimate partner violence     Fear of current or ex partner: None     Emotionally abused: None     Physically abused: None     Forced sexual activity: None   Other Topics Concern    None   Social History Narrative    None       REVIEW OF SYSTEMS     Review of Systems  Except as noted above the remainder of the review of systems was reviewed and is negative.      SCREENINGS    [place] Coma Scale  Eye Opening: Spontaneous  Best Verbal Response: Oriented  Best Motor Response: Obeys commands  [place] Coma Scale Score: 15      PHYSICAL EXAM    (up to 7 for level 4, 8 or more for level 5)     ED Triage Vitals   BP Temp Temp src Pulse Resp SpO2 Height Weight   -- -- -- -- -- -- -- -- Physical Exam  Active and oriented ×3. Nontoxic. No acute distress. Well-hydrated. Head is atraumatic, facies symmetrical.  Neck is supple without adenopathy. Right ear canal and tympanic membrane are unremarkable. Left external auditory canal shows no inflammation or sign of injury, there is a small serous effusion noted on the left tympanic membrane but no erythema noted. Respirations nonlabored. Skin free of any obvious rashes or lesions. Extremities without edema. No acute neurologic deficit noted, range of motion of extremities intact without weakness, good gait and balance clear speech  Good affect. Pleasant patient. DIAGNOSTIC RESULTS       EMERGENCY DEPARTMENT COURSE andMedical Decision Making:       MDM/   I do review the ENTs notes from the 2 visits. We discussed the possibility of steroids with the patient he states he has been on those for this problem and they did not help standing was also on nasal spray that did not help and has had antihistamines as well as decongestants and it only seems to be getting worse. He states the pressure and pain is worse and he is not due to see the ENT for a follow-up visit for a month. He states they were talking about neurology referral due to the fact that he gets the intermittent dizziness but he is neurologically intact at this time I do offer the patient referral to which she agrees. We will also try a short course of meclizine as he does have a serous effusion and description which would be consistent with vertigo as only occasionally moving his head causing the dizziness.     I do note that the patient had a tilt table test ordered today and I asked him about this he states he can get it done because he can start an IV due to his history of previous IV drug abuse while he has not used drugs in several years he states his veins are very bad and they could not get the IV to do the test.    Strict return precautions and follow up

## 2020-07-16 ENCOUNTER — OFFICE VISIT (OUTPATIENT)
Dept: OTOLARYNGOLOGY | Age: 35
End: 2020-07-16
Payer: MEDICAID

## 2020-07-16 VITALS
HEIGHT: 66 IN | DIASTOLIC BLOOD PRESSURE: 77 MMHG | HEART RATE: 67 BPM | BODY MASS INDEX: 27 KG/M2 | TEMPERATURE: 97.7 F | OXYGEN SATURATION: 97 % | RESPIRATION RATE: 16 BRPM | SYSTOLIC BLOOD PRESSURE: 107 MMHG | WEIGHT: 168 LBS

## 2020-07-16 PROCEDURE — G8427 DOCREV CUR MEDS BY ELIG CLIN: HCPCS | Performed by: PHYSICIAN ASSISTANT

## 2020-07-16 PROCEDURE — 99213 OFFICE O/P EST LOW 20 MIN: CPT | Performed by: PHYSICIAN ASSISTANT

## 2020-07-16 PROCEDURE — 1036F TOBACCO NON-USER: CPT | Performed by: PHYSICIAN ASSISTANT

## 2020-07-16 PROCEDURE — G8419 CALC BMI OUT NRM PARAM NOF/U: HCPCS | Performed by: PHYSICIAN ASSISTANT

## 2020-07-16 ASSESSMENT — ENCOUNTER SYMPTOMS
BLOOD IN STOOL: 0
EYE ITCHING: 0
CONSTIPATION: 0
EYE REDNESS: 0
BACK PAIN: 0
ABDOMINAL PAIN: 0
ABDOMINAL DISTENTION: 0
PHOTOPHOBIA: 0
DIARRHEA: 0
VOMITING: 0
FACIAL SWELLING: 0
COLOR CHANGE: 0
CHEST TIGHTNESS: 0
SORE THROAT: 0
STRIDOR: 0
TROUBLE SWALLOWING: 0
EYE DISCHARGE: 0
COUGH: 0
SINUS PRESSURE: 0
RECTAL PAIN: 0
VOICE CHANGE: 0
SHORTNESS OF BREATH: 0
ANAL BLEEDING: 0
EYE PAIN: 0
SINUS PAIN: 0
WHEEZING: 0
NAUSEA: 0
CHOKING: 0
RHINORRHEA: 0
APNEA: 0
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
## 2020-07-16 NOTE — PROGRESS NOTES
Review of Systems   Constitutional: Negative for activity change, appetite change, chills, diaphoresis, fatigue, fever and unexpected weight change. HENT: Positive for ear pain and tinnitus. Negative for congestion, dental problem, drooling, ear discharge, facial swelling, hearing loss, mouth sores, nosebleeds, postnasal drip, rhinorrhea, sinus pressure, sinus pain, sneezing, sore throat, trouble swallowing and voice change. Eyes: Negative for photophobia, pain, discharge, redness, itching and visual disturbance. Respiratory: Negative for apnea, cough, choking, chest tightness, shortness of breath, wheezing and stridor. Cardiovascular: Negative for chest pain, palpitations and leg swelling. Gastrointestinal: Negative for abdominal distention, abdominal pain, anal bleeding, blood in stool, constipation, diarrhea, nausea, rectal pain and vomiting. Endocrine: Negative for cold intolerance, heat intolerance, polydipsia, polyphagia and polyuria. Genitourinary: Negative for decreased urine volume, difficulty urinating, discharge, dysuria, enuresis, flank pain, frequency, genital sores, hematuria, penile pain, penile swelling, scrotal swelling, testicular pain and urgency. Musculoskeletal: Negative for arthralgias, back pain, gait problem, joint swelling, myalgias, neck pain and neck stiffness. Skin: Negative for color change, pallor, rash and wound. Allergic/Immunologic: Negative for environmental allergies, food allergies and immunocompromised state. Neurological: Positive for dizziness. Negative for tremors, seizures, syncope, facial asymmetry, speech difficulty, weakness, light-headedness, numbness and headaches. Hematological: Negative for adenopathy. Does not bruise/bleed easily. Psychiatric/Behavioral: Positive for decreased concentration. Negative for agitation, behavioral problems, confusion, dysphoric mood, hallucinations, self-injury, sleep disturbance and suicidal ideas.  The patient is not nervous/anxious and is not hyperactive.

## 2020-07-28 ENCOUNTER — HOSPITAL ENCOUNTER (OUTPATIENT)
Dept: CT IMAGING | Age: 35
Discharge: HOME | End: 2020-07-30
Payer: MEDICAID

## 2020-07-28 PROCEDURE — 70480 CT ORBIT/EAR/FOSSA W/O DYE: CPT

## 2020-09-21 ENCOUNTER — OFFICE VISIT (OUTPATIENT)
Dept: NEUROLOGY | Age: 35
End: 2020-09-21
Payer: MEDICAID

## 2020-09-21 VITALS
RESPIRATION RATE: 18 BRPM | BODY MASS INDEX: 28.01 KG/M2 | HEART RATE: 83 BPM | DIASTOLIC BLOOD PRESSURE: 76 MMHG | TEMPERATURE: 97.6 F | WEIGHT: 174.3 LBS | HEIGHT: 66 IN | SYSTOLIC BLOOD PRESSURE: 124 MMHG

## 2020-09-21 PROCEDURE — 1036F TOBACCO NON-USER: CPT | Performed by: NEUROMUSCULOSKELETAL MEDICINE, SPORTS MEDICINE

## 2020-09-21 PROCEDURE — G8427 DOCREV CUR MEDS BY ELIG CLIN: HCPCS | Performed by: NEUROMUSCULOSKELETAL MEDICINE, SPORTS MEDICINE

## 2020-09-21 PROCEDURE — G8419 CALC BMI OUT NRM PARAM NOF/U: HCPCS | Performed by: NEUROMUSCULOSKELETAL MEDICINE, SPORTS MEDICINE

## 2020-09-21 PROCEDURE — 99203 OFFICE O/P NEW LOW 30 MIN: CPT | Performed by: NEUROMUSCULOSKELETAL MEDICINE, SPORTS MEDICINE

## 2020-09-21 NOTE — PATIENT INSTRUCTIONS
SURVEY:    You may be receiving a survey from [employer] regarding your visit today. Please complete the survey to enable us to provide the highest quality of care to you and your family. If you cannot score us a very good on any question, please call the office to discuss how we could have made your experience a very good one. Thank you.

## 2020-09-21 NOTE — PROGRESS NOTES
absent, Urinary urgency: absent, Urinary incontinence: absent   Musculoskeletal Neck pain: absent, Back pain: absent, Stiffness: absent, Muscle pain: absent, Joint pain: absent, restless leg : absent   Dermatological Hair loss: absent, Skin changes: absent   Neurological Confusion: absent, Trouble concentrating: present, Seizures: absent;  Memory loss: absent, balance problem: absent, Dizziness: present, vertigo: absent, Weakness: absent, Numbness absent, Tremor: absent, Spasm: absent, involuntary movement: absent, Speech difficulty: absent, Headache: present, Light sensitivity: absent   Psychiatric Anxiety: present, Depression  present, drug abuse: absent, Hallucination: absent, mood disorder: absent, Suicidal ideations absent   Hematologic Abnormal bleeding: absent, Anemia: absent, Lymph gland changes: absent Clotting disorder: absent     Past Medical History:   Diagnosis Date    Headache        Past Surgical History:   Procedure Laterality Date    ADENOIDECTOMY         Social History     Socioeconomic History    Marital status: Single     Spouse name: Not on file    Number of children: Not on file    Years of education: Not on file    Highest education level: Not on file   Occupational History    Not on file   Social Needs    Financial resource strain: Not on file    Food insecurity     Worry: Not on file     Inability: Not on file    Transportation needs     Medical: Not on file     Non-medical: Not on file   Tobacco Use    Smoking status: Former Smoker     Packs/day: 1.00    Smokeless tobacco: Never Used   Substance and Sexual Activity    Alcohol use: No    Drug use: Not Currently     Comment: suboxone, speed    Sexual activity: Yes     Partners: Female   Lifestyle    Physical activity     Days per week: Not on file     Minutes per session: Not on file    Stress: Not on file   Relationships    Social connections     Talks on phone: Not on file     Gets together: Not on file     Attends Alevism service: Not on file     Active member of club or organization: Not on file     Attends meetings of clubs or organizations: Not on file     Relationship status: Not on file    Intimate partner violence     Fear of current or ex partner: Not on file     Emotionally abused: Not on file     Physically abused: Not on file     Forced sexual activity: Not on file   Other Topics Concern    Not on file   Social History Narrative    Not on file       History reviewed. No pertinent family history. Current Outpatient Medications   Medication Sig Dispense Refill    escitalopram (LEXAPRO) 20 MG tablet       buprenorphine-naloxone (SUBOXONE) 12-3 MG sublingual film Buprenorphine / Naloxone Suboxone 12-3 MG Sublingual Film 03/05/2020 Provider: 03-  [facility] 87 ([id number])      buPROPion [facility] SR) 150 MG extended release tablet buPROPion Wellbutrin  MG Oral Tablet Extended Release 12 Hour 03/05/2020 Provider: 03-  [facility] 87 ([id number])       No current facility-administered medications for this visit. DATA:  Lab Results   Component Value Date    WBC 4.5 03/10/2020    HGB 13.8 03/10/2020     03/10/2020    CHOL 147 03/10/2020    TRIG 66 03/10/2020    HDL 45 03/10/2020     (H) 03/10/2020    AST 60 (H) 03/10/2020     03/10/2020    K 4.3 03/10/2020     03/10/2020    CREATININE 0.79 03/10/2020    BUN 13 03/10/2020    CO2 27 03/10/2020    TSH 0.80 03/10/2020    INR 1.1 03/10/2020       /76 (Site: Left Upper Arm, Position: Sitting, Cuff Size: Medium Adult)   Pulse 83   Temp 97.6 °F (36.4 °C) (Temporal)   Resp 18   Ht 5' 6\" (1.676 m)   Wt 174 lb 4.8 oz (79.1 kg)   BMI 28.13 kg/m²     NEUROLOGICAL EXAMINATION:     MENTAL STATUS: Patient is alert and oriented. There is no confusion or aphasia. Memory is normal.     CRANIAL NERVES: Pupils are equal and reactive. EOMS are equal in all directions.   There is no nystagmus or any other abnormal eye movements. Facial sensation is normal.  There is no facial weakness. No loss of hearing. Palate and tongue movements are normal.     MOTOR EXAMINATION: Muscle tone is normal in all the limbs. There is no focal extremity weakness. Muscle strength is 5/5 in both upper and lower limbs. There are no abnormal limb movements. SENSORY EXAMINATION: Normal.     STRETCH REFLEXES: 1+ and symmetrical in both the upper and lower limbs. GAIT: There is no ataxia. No cervical paraspinal muscle tenderness or limitation of cervical range of motion. IMPRESSION:    1. Intermittent dizzy spells after a fall/concussion in June 2020. Possible BPPV. Normal neurological examination. 2.  New onset headaches after the fall and blunt head injury  3. Depression. PLAN:    1.  CT scan of the brain without contrast to rule out intracranial pathology such as subdural hematoma or other possible causes for the headaches . 2. Referred to  physical therapy at [facility] to evaluate for BPPV and treatment as necessary      NOTE: This neurology evaluation is part of outpatient coverage at [facility]  1-2 days per week. Patients requiring frequent evaluations or uncomfortable with potential 3-4 day turnaround on questions or calls  may be better served by a neurologist in the area full time. [facility] at [facility]. [place] is available for outpatient visits and procedures including EMG/NCS. Non-[facility] system neurologists also practice in [facility]] (Dr. Coleman Rasmussen) and [facility] (Thomas Dill). Wanda Stevens MD   9/21/2020  3:04 PM        CC: Leonora MEADOWS

## 2020-09-30 ENCOUNTER — HOSPITAL ENCOUNTER (OUTPATIENT)
Dept: CT IMAGING | Age: 35
Discharge: HOME | End: 2020-10-02
Payer: MEDICAID

## 2020-09-30 PROCEDURE — 70450 CT HEAD/BRAIN W/O DYE: CPT

## 2020-11-03 PROBLEM — R42 DIZZINESS: Status: RESOLVED | Noted: 2020-06-18 | Resolved: 2020-11-03

## 2020-11-08 ENCOUNTER — HOSPITAL ENCOUNTER (EMERGENCY)
Age: 35
Discharge: HOME | End: 2020-11-08
Payer: MEDICAID

## 2020-11-08 VITALS
SYSTOLIC BLOOD PRESSURE: 143 MMHG | RESPIRATION RATE: 16 BRPM | DIASTOLIC BLOOD PRESSURE: 92 MMHG | HEART RATE: 109 BPM | OXYGEN SATURATION: 100 % | TEMPERATURE: 97.7 F

## 2020-11-08 PROCEDURE — 99281 EMR DPT VST MAYX REQ PHY/QHP: CPT

## 2020-11-08 ASSESSMENT — PAIN SCALES - GENERAL: PAINLEVEL_OUTOF10: 2

## 2020-11-08 ASSESSMENT — PAIN DESCRIPTION - ORIENTATION: ORIENTATION: LEFT

## 2020-11-08 ASSESSMENT — PAIN DESCRIPTION - PAIN TYPE: TYPE: ACUTE PAIN

## 2020-11-08 ASSESSMENT — PAIN DESCRIPTION - LOCATION: LOCATION: EAR

## 2020-11-08 NOTE — ED PROVIDER NOTES
[facility] ED  EMERGENCY DEPARTMENT ENCOUNTER      Pt Name: Henny Morris  MRN: [id number]  [place] 1985  Date of evaluation: 11/8/2020  Provider: KAR Hernandez       Chief Complaint   Patient presents with   Hazeline Hui     left ear, onset this morning         HISTORY OF PRESENT ILLNESS   (Location/Symptom, Timing/Onset, Context/Setting, Quality, Duration, Modifying Factors, Severity)  Note limiting factors. Henny Morris is a 30 y.o. male who presents to the emergency department for a complaint of left ear pain. Patient states that he had left ear pain this morning that lasted for 10 minutes. Patient described as a vibrating noise in his ear. He states that it is gone now. He denies any current pain. Nursing Notes were reviewed. REVIEW OF SYSTEMS    (2-9 systems for level 4, 10 or more for level 5)   Constitutional: Negative for fever, chills  Skin: Negative for rash, itching  HENT: see HPI    Except as noted above the remainder of the review of systems was reviewed and negative. PAST MEDICAL HISTORY     Past Medical History:   Diagnosis Date    Headache          SURGICAL HISTORY       Past Surgical History:   Procedure Laterality Date    ADENOIDECTOMY           CURRENT MEDICATIONS       Current Discharge Medication List      CONTINUE these medications which have NOT CHANGED    Details   escitalopram (LEXAPRO) 20 MG tablet       buprenorphine-naloxone (SUBOXONE) 12-3 MG sublingual film Buprenorphine / Naloxone Suboxone 12-3 MG Sublingual Film 03/05/2020 Provider: 03-  [facility] 87 ([id number])      buPROPion [facility] SR) 150 MG extended release tablet buPROPion Wellbutrin  MG Oral Tablet Extended Release 12 Hour 03/05/2020 Provider: 03-  [facility] 87 ([id number])             ALLERGIES     Patient has no known allergies. FAMILY HISTORY     No family history on file.        SOCIAL HISTORY       Social History     Socioeconomic History    Marital status: Single     Spouse name: Not on file    Number of children: Not on file    Years of education: Not on file    Highest education level: Not on file   Occupational History    Not on file   Social Needs    Financial resource strain: Not on file    Food insecurity     Worry: Not on file     Inability: Not on file    Transportation needs     Medical: Not on file     Non-medical: Not on file   Tobacco Use    Smoking status: Former Smoker     Packs/day: 1.00    Smokeless tobacco: Never Used   Substance and Sexual Activity    Alcohol use: No    Drug use: Not Currently     Comment: suboxone, speed    Sexual activity: Yes     Partners: Female   Lifestyle    Physical activity     Days per week: Not on file     Minutes per session: Not on file    Stress: Not on file   Relationships    Social connections     Talks on phone: Not on file     Gets together: Not on file     Attends Episcopal service: Not on file     Active member of club or organization: Not on file     Attends meetings of clubs or organizations: Not on file     Relationship status: Not on file    Intimate partner violence     Fear of current or ex partner: Not on file     Emotionally abused: Not on file     Physically abused: Not on file     Forced sexual activity: Not on file   Other Topics Concern    Not on file   Social History Narrative    Not on file       PHYSICAL EXAM    (up to 7 for level 4, 8 or more for level 5)     ED Triage Vitals [11/08/20 1213]   BP Temp Temp Source Pulse Resp SpO2 Height Weight   (!) 143/92 97.7 °F (36.5 °C) Oral 109 16 100 % -- --     Constitutional: Oriented and well-developed, well-nourished, and in no distress. Non-toxic appearance. Head: Normocephalic and atraumatic.    Right Ear: Tympanic membrane, external ear and ear canal normal.   Left Ear: Tympanic membrane, external ear and ear canal normal.   Nose: No mucosal edema, rhinorrhea, nasal deformity or septal deviation. Eyes: Extra ocular muscles intact. Pupils are equal, round, and reactive to light. No discharge. No scleral icterus. Neck: Normal range of motion and phonation normal. Neck supple. Musculoskeletal: Normal range of motion. No edema and no tenderness. Neurological: Alert and oriented. Skin: Skin is warm and dry. No rash noted. No cyanosis or erythema. No pallor. Nails show no clubbing. DIAGNOSTIC RESULTS     EKG: All EKG's are interpreted by the Emergency Department Physician who either signs or Co-signs this chart in the absence of a cardiologist.    RADIOLOGY:   Non-plain film images such as CT, Ultrasound and MRI are read by the radiologist. Plain radiographic images are visualized and preliminarily interpreted by the emergency physician with the below findings:    Interpretation per the Radiologist below, if available at the time of this note:    No orders to display         ED BEDSIDE ULTRASOUND:   Performed by ED Physician - none    LABS:  No results found for this visit on 11/08/20. No orders of the defined types were placed in this encounter. All other labs were within normal range or not returned as of this dictation. EMERGENCY DEPARTMENT COURSE and DIFFERENTIAL DIAGNOSIS/MDM:   Vitals:    Vitals:    11/08/20 1213   BP: (!) 143/92   Pulse: 109   Resp: 16   Temp: 97.7 °F (36.5 °C)   TempSrc: Oral   SpO2: 100%       MEDICAL DECISION MAKING:  Patient was discharged in stable condition. Patient was directed to follow-up with his ENT again. The patient was evaluated during the global COVID-19 pandemic, and that diagnosis was considered upon their initial presentation. Their evaluation, treatment and testing was consistent with current guidelines for patients who present with complaints or symptoms that may be related to COVID-19.  Full PPE including gloves, gown, N95 or P100 respirator, and eye protection was used during every encounter with this patient. FINAL IMPRESSION      1. Otalgia of left ear Stable         DISPOSITION/PLAN   DISPOSITION Decision To Discharge 11/08/2020 12:49:07 PM      PATIENT REFERRED TO:  Clotilda Cabot, APRN - NP  [address]  [phone]    Schedule an appointment as soon as possible for a visit         DISCHARGE MEDICATIONS:  Current Discharge Medication List        Controlled Substances Monitoring:     No flowsheet data found.     (Please note that portions of this note were completed with a voice recognition program.  Efforts were made to edit the dictations but occasionally words are mis-transcribed.)    Electronically signed by KAR Sigala CNP on 11/8/2020 at 12:50 PM               KAR Sigala CNP  11/08/20 1300

## 2020-11-10 ENCOUNTER — TELEPHONE (OUTPATIENT)
Dept: ENT CLINIC | Age: 35
End: 2020-11-10

## 2020-11-10 NOTE — TELEPHONE ENCOUNTER
Patient contacted office via [employer] questioning if the provider would order MRI for left ear. He feels something  Is inside the ear. Patient scheduled himself appointment for mychart visit on Wednesday 11/11/2020. Should the office contact the patient to see if he can come in for office visit to have ear checked.

## 2020-11-11 ENCOUNTER — OFFICE VISIT (OUTPATIENT)
Dept: ENT CLINIC | Age: 35
End: 2020-11-11
Payer: MEDICAID

## 2020-11-11 VITALS
TEMPERATURE: 98.1 F | BODY MASS INDEX: 29.09 KG/M2 | SYSTOLIC BLOOD PRESSURE: 122 MMHG | WEIGHT: 181 LBS | HEIGHT: 66 IN | OXYGEN SATURATION: 96 % | DIASTOLIC BLOOD PRESSURE: 62 MMHG | HEART RATE: 75 BPM

## 2020-11-11 PROBLEM — R93.0 ABNORMAL CT SCAN OF HEAD: Status: ACTIVE | Noted: 2020-11-11

## 2020-11-11 PROBLEM — H93.8X2 PRESSURE SENSATION IN LEFT EAR: Status: RESOLVED | Noted: 2020-06-18 | Resolved: 2020-11-11

## 2020-11-11 PROCEDURE — 1036F TOBACCO NON-USER: CPT | Performed by: PHYSICIAN ASSISTANT

## 2020-11-11 PROCEDURE — G8484 FLU IMMUNIZE NO ADMIN: HCPCS | Performed by: PHYSICIAN ASSISTANT

## 2020-11-11 PROCEDURE — G8427 DOCREV CUR MEDS BY ELIG CLIN: HCPCS | Performed by: PHYSICIAN ASSISTANT

## 2020-11-11 PROCEDURE — G8419 CALC BMI OUT NRM PARAM NOF/U: HCPCS | Performed by: PHYSICIAN ASSISTANT

## 2020-11-11 PROCEDURE — 99213 OFFICE O/P EST LOW 20 MIN: CPT | Performed by: PHYSICIAN ASSISTANT

## 2020-11-11 RX ORDER — BUPROPION HYDROCHLORIDE 150 MG/1
150 TABLET ORAL EVERY MORNING
COMMUNITY
Start: 2020-10-28

## 2020-11-11 RX ORDER — M-VIT,TX,IRON,MINS/CALC/FOLIC 27MG-0.4MG
1 TABLET ORAL DAILY
COMMUNITY

## 2020-11-11 ASSESSMENT — ENCOUNTER SYMPTOMS
TROUBLE SWALLOWING: 0
SHORTNESS OF BREATH: 0
EYE DISCHARGE: 0
EYE PAIN: 0
RHINORRHEA: 0
SORE THROAT: 0
APNEA: 0
SINUS PRESSURE: 0
BLOOD IN STOOL: 0
ANAL BLEEDING: 0
CONSTIPATION: 0
WHEEZING: 0
RECTAL PAIN: 0
ABDOMINAL PAIN: 0
SINUS PAIN: 0
VOMITING: 0
EYE ITCHING: 0
PHOTOPHOBIA: 0
CHEST TIGHTNESS: 0
ABDOMINAL DISTENTION: 0
COUGH: 0
FACIAL SWELLING: 0
DIARRHEA: 0
EYE REDNESS: 0
STRIDOR: 0
VOICE CHANGE: 0
NAUSEA: 0
BACK PAIN: 0
COLOR CHANGE: 0
CHOKING: 0
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
## 2020-11-11 NOTE — PROGRESS NOTES
Review of Systems   Constitutional: Negative for activity change, appetite change, chills, diaphoresis, fatigue, fever and unexpected weight change. HENT: Positive for tinnitus (left ear buzzing/static). Negative for congestion, dental problem, drooling, ear discharge, ear pain, facial swelling, hearing loss, mouth sores, nosebleeds, postnasal drip, rhinorrhea, sinus pressure, sinus pain, sneezing, sore throat, trouble swallowing and voice change. Eyes: Negative for photophobia, pain, discharge, redness, itching and visual disturbance. Respiratory: Negative for apnea, cough, choking, chest tightness, shortness of breath, wheezing and stridor. Cardiovascular: Negative for chest pain, palpitations and leg swelling. Gastrointestinal: Negative for abdominal distention, abdominal pain, anal bleeding, blood in stool, constipation, diarrhea, nausea, rectal pain and vomiting. Endocrine: Negative for cold intolerance, heat intolerance, polydipsia, polyphagia and polyuria. Genitourinary: Negative for decreased urine volume, difficulty urinating, discharge, dysuria, enuresis, flank pain, frequency, genital sores, hematuria, penile pain, penile swelling, scrotal swelling, testicular pain and urgency. Musculoskeletal: Negative for arthralgias, back pain, gait problem, joint swelling, myalgias, neck pain and neck stiffness. Skin: Negative for color change, pallor, rash and wound. Allergic/Immunologic: Negative for environmental allergies, food allergies and immunocompromised state. Neurological: Negative for dizziness, tremors, seizures, syncope, facial asymmetry, speech difficulty, weakness, light-headedness, numbness and headaches. Hematological: Negative for adenopathy. Does not bruise/bleed easily. Psychiatric/Behavioral: Negative for agitation, behavioral problems, confusion, decreased concentration, dysphoric mood, hallucinations, self-injury, sleep disturbance and suicidal ideas.  The patient is not nervous/anxious and is not hyperactive.

## 2020-11-11 NOTE — PATIENT INSTRUCTIONS
SURVEY:    You may be receiving a survey from [employer] regarding your visit today. Please complete the survey to enable us to provide the highest quality of care to you and your family. If you cannot score us a very good on any question, please call the office to discuss how we could have made your experience a very good one. Thank you.

## 2020-11-25 ENCOUNTER — TELEPHONE (OUTPATIENT)
Dept: ENT CLINIC | Age: 35
End: 2020-11-25

## 2020-11-25 NOTE — TELEPHONE ENCOUNTER
Previous entry noted. I verbally spoke with the person who took the initial message, Arnulfo, earlier this AM and sounds like Pt put the described object (as described by Arnulfo in the previous message) in his ear and when he did this it felt like a magnet/magnetic sensation, but that the object that Pt inserted was pulled back out be Pt. It's unclear to me why he put this object in there in the first place. Confirm/clarify that the object that Pt put in his ear is not stuck /retained in his ear now. Can move appointment up if needed, but if Pt not having any ear pain, ear drainage, hearing loss and there is not a known foreign body in his ear can keep follow-up for 12/10/20.

## 2020-11-25 NOTE — TELEPHONE ENCOUNTER
Pt called and stated he was seen on 11/11/20 and was advise if he had any concerns to contact the office. Pt stated he feel like a magnetic is in his ear, he feel static and vibration on left ear. He stated he put a thin magnet tooth pick thin in his ear and it got stuck like if a magnetic was in there and is concern and would like to speak to Trent if possible, pt stated he has an appt on 12/10/20 but did not know if it was something he should be concern. Please advise.

## 2020-11-25 NOTE — TELEPHONE ENCOUNTER
Spoke to pt and he stated he did not have anything stuck or retained in his ear. He said when he put the object in it got stuck to something in his ear like if it was magnetic in there. He then turned his head and the bject he put in there fell out, But he does think and feel there something in there. Pt denied ear pain, ear drainage or hearing loss. Pt was advise to keep appt on 12/10/20 if he had any other concerns to call the office.

## 2020-12-10 ENCOUNTER — TELEMEDICINE (OUTPATIENT)
Dept: OTOLARYNGOLOGY | Age: 35
End: 2020-12-10
Payer: MEDICAID

## 2020-12-10 ENCOUNTER — TELEPHONE (OUTPATIENT)
Dept: OTOLARYNGOLOGY | Age: 35
End: 2020-12-10

## 2020-12-10 PROCEDURE — 99213 OFFICE O/P EST LOW 20 MIN: CPT | Performed by: PHYSICIAN ASSISTANT

## 2020-12-10 ASSESSMENT — ENCOUNTER SYMPTOMS
VOMITING: 0
BLOOD IN STOOL: 0
SHORTNESS OF BREATH: 0
ABDOMINAL PAIN: 0
APNEA: 0
SORE THROAT: 0
WHEEZING: 0
RECTAL PAIN: 0
RHINORRHEA: 0
CHOKING: 0
BACK PAIN: 0
TROUBLE SWALLOWING: 0
EYE ITCHING: 0
COLOR CHANGE: 0
DIARRHEA: 0
CHEST TIGHTNESS: 0
FACIAL SWELLING: 0
CONSTIPATION: 0
EYE PAIN: 0
ABDOMINAL DISTENTION: 0
SINUS PAIN: 0
VOICE CHANGE: 0
EYE DISCHARGE: 0
SINUS PRESSURE: 0
NAUSEA: 0
ANAL BLEEDING: 0
PHOTOPHOBIA: 0
STRIDOR: 0
COUGH: 0
EYE REDNESS: 0
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
## 2020-12-10 NOTE — PROGRESS NOTES
Review of Systems   Constitutional: Negative for activity change, appetite change, chills, diaphoresis, fatigue, fever and unexpected weight change. HENT: Positive for ear pain, hearing loss and tinnitus. Negative for congestion, dental problem, drooling, ear discharge, facial swelling, mouth sores, nosebleeds, postnasal drip, rhinorrhea, sinus pressure, sinus pain, sneezing, sore throat, trouble swallowing and voice change. Eyes: Negative for photophobia, pain, discharge, redness, itching and visual disturbance. Respiratory: Negative for apnea, cough, choking, chest tightness, shortness of breath, wheezing and stridor. Cardiovascular: Negative for chest pain, palpitations and leg swelling. Gastrointestinal: Negative for abdominal distention, abdominal pain, anal bleeding, blood in stool, constipation, diarrhea, nausea, rectal pain and vomiting. Endocrine: Negative for cold intolerance, heat intolerance, polydipsia, polyphagia and polyuria. Genitourinary: Negative for decreased urine volume, difficulty urinating, discharge, dysuria, enuresis, flank pain, frequency, genital sores, hematuria, penile pain, penile swelling, scrotal swelling, testicular pain and urgency. Musculoskeletal: Negative for arthralgias, back pain, gait problem, joint swelling, myalgias, neck pain and neck stiffness. Skin: Negative for color change, pallor, rash and wound. Allergic/Immunologic: Negative for environmental allergies, food allergies and immunocompromised state. Neurological: Negative for dizziness, tremors, seizures, syncope, facial asymmetry, speech difficulty, weakness, light-headedness, numbness and headaches. Hematological: Negative for adenopathy. Does not bruise/bleed easily. Psychiatric/Behavioral: Negative for agitation, behavioral problems, confusion, decreased concentration, dysphoric mood, hallucinations, self-injury, sleep disturbance and suicidal ideas.  The patient is not nervous/anxious and is not hyperactive.

## 2020-12-17 ENCOUNTER — HOSPITAL ENCOUNTER (OUTPATIENT)
Dept: MRI IMAGING | Age: 35
Discharge: HOME | End: 2020-12-19
Payer: MEDICAID

## 2020-12-17 ENCOUNTER — HOSPITAL ENCOUNTER (OUTPATIENT)
Dept: GENERAL RADIOLOGY | Age: 35
Discharge: HOME | End: 2020-12-19
Payer: MEDICAID

## 2020-12-17 PROCEDURE — 70030 X-RAY EYE FOR FOREIGN BODY: CPT

## 2020-12-17 PROCEDURE — 6360000004 HC RX CONTRAST MEDICATION: Performed by: PHYSICIAN ASSISTANT

## 2020-12-17 PROCEDURE — 70553 MRI BRAIN STEM W/O & W/DYE: CPT

## 2020-12-17 PROCEDURE — A9579 GAD-BASE MR CONTRAST NOS,1ML: HCPCS | Performed by: PHYSICIAN ASSISTANT

## 2020-12-17 RX ADMIN — GADOTERIDOL 16 ML: 279.3 INJECTION, SOLUTION INTRAVENOUS at 11:20

## 2021-04-01 ENCOUNTER — OFFICE VISIT (OUTPATIENT)
Dept: OTOLARYNGOLOGY | Age: 36
End: 2021-04-01
Payer: MEDICAID

## 2021-04-01 VITALS
SYSTOLIC BLOOD PRESSURE: 134 MMHG | HEART RATE: 85 BPM | RESPIRATION RATE: 18 BRPM | HEIGHT: 66 IN | BODY MASS INDEX: 31.21 KG/M2 | DIASTOLIC BLOOD PRESSURE: 82 MMHG | WEIGHT: 194.2 LBS | TEMPERATURE: 99.9 F

## 2021-04-01 DIAGNOSIS — R93.0 ABNORMAL CT SCAN OF HEAD: ICD-10-CM

## 2021-04-01 DIAGNOSIS — H93.293 ABNORMAL AUDITORY PERCEPTION OF BOTH EARS: Primary | ICD-10-CM

## 2021-04-01 DIAGNOSIS — H93.13 TINNITUS, BILATERAL: ICD-10-CM

## 2021-04-01 PROCEDURE — 99213 OFFICE O/P EST LOW 20 MIN: CPT | Performed by: PHYSICIAN ASSISTANT

## 2021-04-01 PROCEDURE — G8417 CALC BMI ABV UP PARAM F/U: HCPCS | Performed by: PHYSICIAN ASSISTANT

## 2021-04-01 PROCEDURE — 1036F TOBACCO NON-USER: CPT | Performed by: PHYSICIAN ASSISTANT

## 2021-04-01 PROCEDURE — G8427 DOCREV CUR MEDS BY ELIG CLIN: HCPCS | Performed by: PHYSICIAN ASSISTANT

## 2021-04-01 RX ORDER — PALIPERIDONE 1.5 MG/1
1.5 TABLET, EXTENDED RELEASE ORAL DAILY
COMMUNITY
Start: 2021-03-31

## 2021-04-01 ASSESSMENT — ENCOUNTER SYMPTOMS
EYE ITCHING: 0
SINUS PAIN: 0
TROUBLE SWALLOWING: 0
COUGH: 0
APNEA: 0
VOICE CHANGE: 0
FACIAL SWELLING: 0
RHINORRHEA: 0
DIARRHEA: 0
ANAL BLEEDING: 0
NAUSEA: 0
EYE REDNESS: 0
WHEEZING: 0
STRIDOR: 0
SHORTNESS OF BREATH: 0
CHEST TIGHTNESS: 0
CONSTIPATION: 0
COLOR CHANGE: 0
CHOKING: 0
SORE THROAT: 0
BACK PAIN: 0
BLOOD IN STOOL: 0
EYE PAIN: 0
SINUS PRESSURE: 0
RECTAL PAIN: 0
ABDOMINAL DISTENTION: 0
EYE DISCHARGE: 0
PHOTOPHOBIA: 0
VOMITING: 0
ABDOMINAL PAIN: 0
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
## 2021-04-01 NOTE — PROGRESS NOTES
Review of Systems   Constitutional: Positive for diaphoresis. Negative for activity change, appetite change, chills, fatigue, fever and unexpected weight change. HENT: Positive for ear pain and tinnitus. Negative for congestion, dental problem, drooling, ear discharge, facial swelling, hearing loss, mouth sores, nosebleeds, postnasal drip, rhinorrhea, sinus pressure, sinus pain, sneezing, sore throat, trouble swallowing and voice change. Eyes: Negative for photophobia, pain, discharge, redness, itching and visual disturbance. Respiratory: Negative for apnea, cough, choking, chest tightness, shortness of breath, wheezing and stridor. Cardiovascular: Negative for chest pain, palpitations and leg swelling. Gastrointestinal: Negative for abdominal distention, abdominal pain, anal bleeding, blood in stool, constipation, diarrhea, nausea, rectal pain and vomiting. Endocrine: Negative for cold intolerance, heat intolerance, polydipsia, polyphagia and polyuria. Genitourinary: Negative for decreased urine volume, difficulty urinating, discharge, dysuria, enuresis, flank pain, frequency, genital sores, hematuria, penile pain, penile swelling, scrotal swelling, testicular pain and urgency. Musculoskeletal: Negative for arthralgias, back pain, gait problem, joint swelling, myalgias, neck pain and neck stiffness. Skin: Negative for color change, pallor, rash and wound. Allergic/Immunologic: Negative for environmental allergies, food allergies and immunocompromised state. Neurological: Negative for dizziness, tremors, seizures, syncope, facial asymmetry, speech difficulty, weakness, light-headedness, numbness and headaches. Hematological: Negative for adenopathy. Does not bruise/bleed easily. Psychiatric/Behavioral: Positive for agitation and sleep disturbance. Negative for behavioral problems, confusion, decreased concentration, dysphoric mood, hallucinations, self-injury and suicidal ideas.  The

## 2021-04-12 ENCOUNTER — HOSPITAL ENCOUNTER (OUTPATIENT)
Dept: CT IMAGING | Age: 36
Discharge: HOME | End: 2021-04-14
Payer: MEDICAID

## 2021-04-12 DIAGNOSIS — R93.0 ABNORMAL CT SCAN OF HEAD: ICD-10-CM

## 2021-04-12 DIAGNOSIS — H93.13 TINNITUS, BILATERAL: ICD-10-CM

## 2021-04-12 DIAGNOSIS — H93.293 ABNORMAL AUDITORY PERCEPTION OF BOTH EARS: ICD-10-CM

## 2021-04-12 PROCEDURE — 70480 CT ORBIT/EAR/FOSSA W/O DYE: CPT

## 2021-04-14 ENCOUNTER — TELEPHONE (OUTPATIENT)
Dept: ENT CLINIC | Age: 36
End: 2021-04-14

## 2021-04-14 ENCOUNTER — OFFICE VISIT (OUTPATIENT)
Dept: ENT CLINIC | Age: 36
End: 2021-04-14
Payer: MEDICAID

## 2021-04-14 VITALS
BODY MASS INDEX: 31.95 KG/M2 | WEIGHT: 198.8 LBS | HEIGHT: 66 IN | HEART RATE: 95 BPM | TEMPERATURE: 97.7 F | DIASTOLIC BLOOD PRESSURE: 82 MMHG | OXYGEN SATURATION: 98 % | SYSTOLIC BLOOD PRESSURE: 124 MMHG

## 2021-04-14 DIAGNOSIS — H93.292 ABNORMAL AUDITORY PERCEPTION, LEFT: Primary | ICD-10-CM

## 2021-04-14 PROCEDURE — 1036F TOBACCO NON-USER: CPT | Performed by: OTOLARYNGOLOGY

## 2021-04-14 PROCEDURE — G8427 DOCREV CUR MEDS BY ELIG CLIN: HCPCS | Performed by: OTOLARYNGOLOGY

## 2021-04-14 PROCEDURE — G8417 CALC BMI ABV UP PARAM F/U: HCPCS | Performed by: OTOLARYNGOLOGY

## 2021-04-14 PROCEDURE — 99213 OFFICE O/P EST LOW 20 MIN: CPT | Performed by: OTOLARYNGOLOGY

## 2021-04-14 ASSESSMENT — ENCOUNTER SYMPTOMS
ABDOMINAL DISTENTION: 0
EYE DISCHARGE: 0
BLOOD IN STOOL: 0
EYE PAIN: 0
RECTAL PAIN: 0
ABDOMINAL PAIN: 0
CHOKING: 0
BACK PAIN: 0
EYE REDNESS: 0
RHINORRHEA: 0
VOMITING: 0
NAUSEA: 0
WHEEZING: 0
APNEA: 0
EYE ITCHING: 0
FACIAL SWELLING: 0
COLOR CHANGE: 0
SORE THROAT: 0
VOICE CHANGE: 0
SINUS PRESSURE: 0
SINUS PAIN: 0
STRIDOR: 0
TROUBLE SWALLOWING: 0
COUGH: 0
SHORTNESS OF BREATH: 0
ANAL BLEEDING: 0
DIARRHEA: 0
PHOTOPHOBIA: 0
CONSTIPATION: 0
CHEST TIGHTNESS: 0
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
## 2021-04-14 NOTE — TELEPHONE ENCOUNTER
I attempted to contact Tenisha Nunez. I was subsequently transferred to a voicemail. I left voicemail that I was calling in regards to this Pt and left return # of [phone] and my cell #.

## 2021-04-14 NOTE — PROGRESS NOTES
Review of Systems   Constitutional: Negative for activity change, appetite change, chills, diaphoresis, fatigue, fever and unexpected weight change. HENT: Positive for tinnitus. Negative for congestion, dental problem, drooling, ear discharge, ear pain, facial swelling, hearing loss, mouth sores, nosebleeds, postnasal drip, rhinorrhea, sinus pressure, sinus pain, sneezing, sore throat, trouble swallowing and voice change. Eyes: Negative for photophobia, pain, discharge, redness, itching and visual disturbance. Respiratory: Negative for apnea, cough, choking, chest tightness, shortness of breath, wheezing and stridor. Cardiovascular: Negative for chest pain, palpitations and leg swelling. Gastrointestinal: Negative for abdominal distention, abdominal pain, anal bleeding, blood in stool, constipation, diarrhea, nausea, rectal pain and vomiting. Endocrine: Negative for cold intolerance, heat intolerance, polydipsia, polyphagia and polyuria. Genitourinary: Negative for decreased urine volume, difficulty urinating, discharge, dysuria, enuresis, flank pain, frequency, genital sores, hematuria, penile pain, penile swelling, scrotal swelling, testicular pain and urgency. Musculoskeletal: Negative for arthralgias, back pain, gait problem, joint swelling, myalgias, neck pain and neck stiffness. Skin: Negative for color change, pallor, rash and wound. Allergic/Immunologic: Negative for environmental allergies, food allergies and immunocompromised state. Neurological: Negative for dizziness, tremors, seizures, syncope, facial asymmetry, speech difficulty, weakness, light-headedness, numbness and headaches. Hematological: Negative for adenopathy. Does not bruise/bleed easily. Psychiatric/Behavioral: Negative for agitation, behavioral problems, confusion, decreased concentration, dysphoric mood, hallucinations, self-injury, sleep disturbance and suicidal ideas.  The patient is not nervous/anxious and is not hyperactive.

## 2021-04-14 NOTE — TELEPHONE ENCOUNTER
Pt signed release form to give me permission to talk to psychiatrist Duane Mcdaniels at [facility] in [facility]). Phone # [phone]. I will call Kiya Baird to let her know that no physical cause of complaints noted on work-up and complaints likely psychiatric in nature (having paranoia/feeling of persecution, delusions, hallucinations).

## 2021-06-12 ENCOUNTER — HOSPITAL ENCOUNTER (EMERGENCY)
Age: 36
Discharge: HOME | End: 2021-06-12
Attending: EMERGENCY MEDICINE
Payer: MEDICAID

## 2021-06-12 ENCOUNTER — APPOINTMENT (OUTPATIENT)
Dept: GENERAL RADIOLOGY | Age: 36
End: 2021-06-12
Payer: MEDICAID

## 2021-06-12 VITALS
RESPIRATION RATE: 20 BRPM | SYSTOLIC BLOOD PRESSURE: 123 MMHG | OXYGEN SATURATION: 96 % | HEART RATE: 99 BPM | DIASTOLIC BLOOD PRESSURE: 75 MMHG | TEMPERATURE: 97.7 F

## 2021-06-12 VITALS
HEART RATE: 88 BPM | TEMPERATURE: 97.4 F | OXYGEN SATURATION: 98 % | DIASTOLIC BLOOD PRESSURE: 98 MMHG | RESPIRATION RATE: 16 BRPM | SYSTOLIC BLOOD PRESSURE: 144 MMHG

## 2021-06-12 DIAGNOSIS — F41.1 ANXIETY STATE: Primary | ICD-10-CM

## 2021-06-12 LAB
ABSOLUTE EOS #: <0.03 K/UL (ref 0–0.44)
ABSOLUTE IMMATURE GRANULOCYTE: 0.03 K/UL (ref 0–0.3)
ABSOLUTE LYMPH #: 1.3 K/UL (ref 1.1–3.7)
ABSOLUTE MONO #: 0.35 K/UL (ref 0.1–1.2)
ALBUMIN SERPL-MCNC: 4.8 G/DL (ref 3.5–5.2)
ALBUMIN/GLOBULIN RATIO: 1.3 (ref 1–2.5)
ALP BLD-CCNC: 146 U/L (ref 40–129)
ALT SERPL-CCNC: 42 U/L (ref 5–41)
ANION GAP SERPL CALCULATED.3IONS-SCNC: 8 MMOL/L (ref 9–17)
AST SERPL-CCNC: 24 U/L
BASOPHILS # BLD: 0 % (ref 0–2)
BASOPHILS ABSOLUTE: <0.03 K/UL (ref 0–0.2)
BILIRUB SERPL-MCNC: 0.45 MG/DL (ref 0.3–1.2)
BUN BLDV-MCNC: 14 MG/DL (ref 6–20)
BUN/CREAT BLD: 15 (ref 9–20)
CALCIUM SERPL-MCNC: 9.7 MG/DL (ref 8.6–10.4)
CHLORIDE BLD-SCNC: 100 MMOL/L (ref 98–107)
CO2: 27 MMOL/L (ref 20–31)
CREAT SERPL-MCNC: 0.92 MG/DL (ref 0.7–1.2)
DIFFERENTIAL TYPE: ABNORMAL
EOSINOPHILS RELATIVE PERCENT: 0 % (ref 1–4)
GFR AFRICAN AMERICAN: >60 ML/MIN
GFR NON-AFRICAN AMERICAN: >60 ML/MIN
GFR SERPL CREATININE-BSD FRML MDRD: ABNORMAL ML/MIN/{1.73_M2}
GFR SERPL CREATININE-BSD FRML MDRD: ABNORMAL ML/MIN/{1.73_M2}
GLUCOSE BLD-MCNC: 149 MG/DL (ref 70–99)
HCT VFR BLD CALC: 43.8 % (ref 40.7–50.3)
HEMOGLOBIN: 15.2 G/DL (ref 13–17)
IMMATURE GRANULOCYTES: 1 %
LYMPHOCYTES # BLD: 20 % (ref 24–43)
MAGNESIUM: 2.2 MG/DL (ref 1.6–2.6)
MCH RBC QN AUTO: 30 PG (ref 25.2–33.5)
MCHC RBC AUTO-ENTMCNC: 34.7 G/DL (ref 28.4–34.8)
MCV RBC AUTO: 86.6 FL (ref 82.6–102.9)
MONOCYTES # BLD: 5 % (ref 3–12)
NRBC AUTOMATED: 0 PER 100 WBC
PDW BLD-RTO: 11.9 % (ref 11.8–14.4)
PLATELET # BLD: 194 K/UL (ref 138–453)
PLATELET ESTIMATE: ABNORMAL
PMV BLD AUTO: 9.2 FL (ref 8.1–13.5)
POTASSIUM SERPL-SCNC: 4.1 MMOL/L (ref 3.7–5.3)
RBC # BLD: 5.06 M/UL (ref 4.21–5.77)
RBC # BLD: ABNORMAL 10*6/UL
SEG NEUTROPHILS: 74 % (ref 36–65)
SEGMENTED NEUTROPHILS ABSOLUTE COUNT: 4.74 K/UL (ref 1.5–8.1)
SODIUM BLD-SCNC: 135 MMOL/L (ref 135–144)
TOTAL CK: 182 U/L (ref 39–308)
TOTAL PROTEIN: 8.5 G/DL (ref 6.4–8.3)
TROPONIN INTERP: NORMAL
TROPONIN T: NORMAL NG/ML
TROPONIN, HIGH SENSITIVITY: <6 NG/L (ref 0–22)
WBC # BLD: 6.5 K/UL (ref 3.5–11.3)
WBC # BLD: ABNORMAL 10*3/UL

## 2021-06-12 PROCEDURE — 93005 ELECTROCARDIOGRAM TRACING: CPT | Performed by: EMERGENCY MEDICINE

## 2021-06-12 PROCEDURE — 36415 COLL VENOUS BLD VENIPUNCTURE: CPT

## 2021-06-12 PROCEDURE — 99284 EMERGENCY DEPT VISIT MOD MDM: CPT

## 2021-06-12 PROCEDURE — 96374 THER/PROPH/DIAG INJ IV PUSH: CPT

## 2021-06-12 PROCEDURE — 83735 ASSAY OF MAGNESIUM: CPT

## 2021-06-12 PROCEDURE — 2580000003 HC RX 258: Performed by: EMERGENCY MEDICINE

## 2021-06-12 PROCEDURE — 6370000000 HC RX 637 (ALT 250 FOR IP): Performed by: EMERGENCY MEDICINE

## 2021-06-12 PROCEDURE — 82550 ASSAY OF CK (CPK): CPT

## 2021-06-12 PROCEDURE — 84484 ASSAY OF TROPONIN QUANT: CPT

## 2021-06-12 PROCEDURE — 85025 COMPLETE CBC W/AUTO DIFF WBC: CPT

## 2021-06-12 PROCEDURE — 80053 COMPREHEN METABOLIC PANEL: CPT

## 2021-06-12 PROCEDURE — 71046 X-RAY EXAM CHEST 2 VIEWS: CPT

## 2021-06-12 PROCEDURE — 6360000002 HC RX W HCPCS: Performed by: EMERGENCY MEDICINE

## 2021-06-12 RX ORDER — HYDROXYZINE HYDROCHLORIDE 25 MG/1
25 TABLET, FILM COATED ORAL EVERY 6 HOURS PRN
Qty: 20 TABLET | Refills: 0 | Status: SHIPPED | OUTPATIENT
Start: 2021-06-12 | End: 2021-06-22

## 2021-06-12 RX ORDER — LORAZEPAM 2 MG/ML
1 INJECTION INTRAMUSCULAR ONCE
Status: COMPLETED | OUTPATIENT
Start: 2021-06-12 | End: 2021-06-12

## 2021-06-12 RX ORDER — LORAZEPAM 1 MG/1
1 TABLET ORAL ONCE
Status: COMPLETED | OUTPATIENT
Start: 2021-06-12 | End: 2021-06-12

## 2021-06-12 RX ORDER — 0.9 % SODIUM CHLORIDE 0.9 %
1000 INTRAVENOUS SOLUTION INTRAVENOUS ONCE
Status: COMPLETED | OUTPATIENT
Start: 2021-06-12 | End: 2021-06-12

## 2021-06-12 RX ADMIN — LORAZEPAM 1 MG: 1 TABLET ORAL at 20:29

## 2021-06-12 RX ADMIN — LORAZEPAM 1 MG: 2 INJECTION INTRAMUSCULAR; INTRAVENOUS at 10:59

## 2021-06-12 RX ADMIN — SODIUM CHLORIDE 1000 ML: 9 INJECTION, SOLUTION INTRAVENOUS at 11:01

## 2021-06-12 ASSESSMENT — ENCOUNTER SYMPTOMS
GASTROINTESTINAL NEGATIVE: 1
ALLERGIC/IMMUNOLOGIC NEGATIVE: 1
EYES NEGATIVE: 1
RESPIRATORY NEGATIVE: 1

## 2021-06-12 NOTE — ED PROVIDER NOTES
[facility]  Emergency Department Encounter  EmergencyMedicine Attending     Pt Name:Chandler Middleton  MRN: [id number]  [place] 1985  Date of evaluation: 6/12/21  PCP:  KAR Walden NP    CHIEF COMPLAINT       Chief Complaint   Patient presents with    Anxiety     here earlier today, felt better but now worse       HISTORY OF PRESENT ILLNESS  (Location/Symptom, Timing/Onset, Context/Setting, Quality, Duration, Modifying Factors, Severity.)      Raiza Millan is a 29 y.o. male who presents with concern for feeling anxious. No suicidal ideation, no thoughts of hurting himself or anyone else. Patient says that he just feels fidgety, this has been going on all day today, in fact has been going on for a week but got worse today. Patient was seen here in the emergency room earlier, had basic blood work done which was all unremarkable. However at the time his EKG and troponin was not checked which is why I ordered those tests. Patient however denies any chest pain, denies any shortness of breath, denies any difficulty breathing, denies any nausea vomiting. Says that he only feels fidgety and anxious but denies any other complaints. No pain anywhere at all. Patient says that he was given Ativan earlier today which helped significantly however of once the Ativan started running out he felt the same and came back. PAST MEDICAL / SURGICAL / SOCIAL / FAMILY HISTORY      has a past medical history of Anxiety, Depression, and Headache.     has a past surgical history that includes Adenoidectomy.     Social History     Socioeconomic History    Marital status: Single     Spouse name: Not on file    Number of children: Not on file    Years of education: Not on file    Highest education level: Not on file   Occupational History    Not on file   Tobacco Use    Smoking status: Former Smoker     Packs/day: 1.00    Smokeless tobacco: Never Used   Vaping Use    Vaping Use: Every day    Substances: Nicotine   Substance and Sexual Activity    Alcohol use: No    Drug use: Not Currently     Comment: suboxone, speed    Sexual activity: Yes     Partners: Female   Other Topics Concern    Not on file   Social History Narrative    Not on file     Social Determinants of Health     Financial Resource Strain:     Difficulty of Paying Living Expenses:    Food Insecurity:     Worried About Running Out of Food in the Last Year:     [address] in the Last Year:    Transportation Needs:     Lack of Transportation (Medical):  Lack of Transportation (Non-Medical):    Physical Activity:     Days of Exercise per Week:     Minutes of Exercise per Session:    Stress:     Feeling of Stress :    Social Connections:     Frequency of Communication with Friends and Family:     Frequency of Social Gatherings with Friends and Family:     Attends Protestant Services:     Active Member of Clubs or Organizations:     Attends Club or Organization Meetings:     Marital Status:    Intimate Partner Violence:     Fear of Current or Ex-Partner:     Emotionally Abused:     Physically Abused:     Sexually Abused:        History reviewed. No pertinent family history. Allergies:  Patient has no known allergies. Home Medications:  Prior to Admission medications    Medication Sig Start Date End Date Taking?  Authorizing Provider   hydrOXYzine (ATARAX) 25 MG tablet Take 1 tablet by mouth every 6 hours as needed for Anxiety 6/12/21 6/22/21 Yes Reggie Lora MD   paliperidone (INVEGA) 1.5 MG extended release tablet Take 1.5 mg by mouth daily  3/31/21   Historical Provider, MD   buPROPion (WELLBUTRIN XL) 150 MG extended release tablet 150 mg every morning  10/28/20   Historical Provider, MD   Multiple Vitamins-Minerals (THERAPEUTIC MULTIVITAMIN-MINERALS) tablet Take 1 tablet by mouth daily    Historical Provider, MD   escitalopram (LEXAPRO) 20 MG tablet 20 mg daily  6/15/20   Historical Provider, MD   buprenorphine-naloxone (SUBOXONE) 12-3 MG sublingual film 1 Film daily. 3/5/20   Historical Provider, MD       REVIEW OF SYSTEMS    (2-9 systems for level 4, 10 or more for level 5)      Review of Systems   Constitutional: Negative for chills and fever. Respiratory: Negative for chest tightness and shortness of breath. Cardiovascular: Negative for chest pain. Gastrointestinal: Negative for abdominal pain, nausea and vomiting. Skin: Negative for color change. Neurological: Negative for weakness and numbness. Psychiatric/Behavioral: Negative for confusion and suicidal ideas. The patient is nervous/anxious. PHYSICAL EXAM   (up to 7 for level 4, 8 or more for level 5)      INITIAL VITALS:   BP (!) 144/98   Pulse 88   Temp 97.4 °F (36.3 °C)   Resp 16   SpO2 98%     Physical Exam  Vitals reviewed. Constitutional:       General: He is not in acute distress. Appearance: He is well-developed. HENT:      Head: Normocephalic and atraumatic. Eyes:      General:         Right eye: No discharge. Left eye: No discharge. Conjunctiva/sclera: Conjunctivae normal.   Cardiovascular:      Rate and Rhythm: Normal rate and regular rhythm. Heart sounds: Normal heart sounds. No murmur heard. No friction rub. No gallop. Pulmonary:      Effort: Pulmonary effort is normal. No respiratory distress. Breath sounds: Normal breath sounds. No wheezing or rales. Abdominal:      General: There is no distension. Palpations: Abdomen is soft. Tenderness: There is no abdominal tenderness. There is no guarding or rebound. Musculoskeletal:         General: No tenderness. Skin:     General: Skin is warm and dry. Findings: No erythema.          DIFFERENTIAL  DIAGNOSIS     PLAN (LABS / IMAGING / EKG):  Orders Placed This Encounter   Procedures    XR CHEST (2 VW)    Troponin    EKG 12 Lead       MEDICATIONS ORDERED:  Orders Placed This Encounter   Medications normal  Axis: normal  Ectopy: none  Conduction: normal  ST Segments: no acute change  T Waves: no acute change  Q Waves: none    Clinical Impression: no acute changes and normal EKG    All EKG's are interpreted by the Emergency Department Physician who either signs or Co-signs this chart in the absence of a cardiologist.      PROCEDURES:  None    CONSULTS:  None    CRITICAL CARE:  None    FINAL IMPRESSION      1.  Anxiety state          DISPOSITION / PLAN     DISPOSITION Decision To Discharge 06/12/2021 09:11:53 PM      PATIENT REFERRED TO:  KAR Knutson NP  [address]  [phone]    Call in 1 day        DISCHARGE MEDICATIONS:  Discharge Medication List as of 6/12/2021  9:12 PM      START taking these medications    Details   hydrOXYzine (ATARAX) 25 MG tablet Take 1 tablet by mouth every 6 hours as needed for Anxiety, Disp-20 tablet, R-0Print             Eduardo Aparicio MD  Emergency Medicine Attending    (Please note that portions of thisnote were completed with a voice recognition program.  Efforts were made to edit the dictations but occasionally words are mis-transcribed.)        Eduardo Aparicio MD  06/13/21 [id number]

## 2021-06-12 NOTE — ED PROVIDER NOTES
[facility] ED  EMERGENCY DEPARTMENT ENCOUNTER      Pt Name: Gavino Melgar  MRN: [id number]  [place] 1985  Date of evaluation: 6/12/2021  Provider: Taina Marx MD    [address] [place] [address]       Chief Complaint   Patient presents with    Anxiety     progressively worsening over past 3 days         HISTORY OF PRESENT ILLNESS   (Location/Symptom, Timing/Onset, Context/Setting, Quality, Duration, Modifying Factors, Severity)  Note limiting factors. Gavino Melgar is a 29 y.o. male who presents to the emergency department     29-year-old gentleman presents emergency department with concerns of increasing anxiety. Patient has had these feelings for at least 2 to 3 days. Denies any headache admits no chest discomfort or shortness of breath. Patient states that he does have a history for chronic anxiety. And also he had a history for opiate abuse in the past.  Patient has been clean since 2017 and on Suboxone. Other medication include Wellbutrin and Lexapro. Patient admits no fever or chilling. Nursing Notes were reviewed. REVIEW OF SYSTEMS    (2-9 systems for level 4, 10 or more for level 5)     Review of Systems   Constitutional: Negative. HENT: Negative. Eyes: Negative. Respiratory: Negative. Cardiovascular: Negative. Gastrointestinal: Negative. Endocrine: Negative. Genitourinary: Negative. Musculoskeletal: Negative. Skin: Negative. Allergic/Immunologic: Negative. Neurological: Negative. Hematological: Negative. Psychiatric/Behavioral:        Anxiety       Except as noted above the remainder of the review of systems was reviewed and negative.        PAST MEDICAL HISTORY     Past Medical History:   Diagnosis Date    Anxiety     Depression     Headache          SURGICAL HISTORY       Past Surgical History:   Procedure Laterality Date    ADENOIDECTOMY           CURRENT MEDICATIONS       Discharge Medication List as of 6/12/2021 11:54 AM CONTINUE these medications which have NOT CHANGED    Details   paliperidone (INVEGA) 1.5 MG extended release tablet Take 1.5 mg by mouth daily Historical Med      buPROPion (WELLBUTRIN XL) 150 MG extended release tablet 150 mg every morning Historical Med      Multiple Vitamins-Minerals (THERAPEUTIC MULTIVITAMIN-MINERALS) tablet Take 1 tablet by mouth dailyHistorical Med      escitalopram (LEXAPRO) 20 MG tablet 20 mg daily Historical Med      buprenorphine-naloxone (SUBOXONE) 12-3 MG sublingual film 1 Film daily. Historical Med             ALLERGIES     Patient has no known allergies. FAMILY HISTORY     History reviewed. No pertinent family history. SOCIAL HISTORY       Social History     Socioeconomic History    Marital status: Single     Spouse name: None    Number of children: None    Years of education: None    Highest education level: None   Occupational History    None   Tobacco Use    Smoking status: Former Smoker     Packs/day: 1.00    Smokeless tobacco: Never Used   Vaping Use    Vaping Use: Every day    Substances: Nicotine   Substance and Sexual Activity    Alcohol use: No    Drug use: Not Currently     Comment: suboxone, speed    Sexual activity: Yes     Partners: Female   Other Topics Concern    None   Social History Narrative    None     Social Determinants of Health     Financial Resource Strain:     Difficulty of Paying Living Expenses:    Food Insecurity:     Worried About Running Out of Food in the Last Year:     Ran Out of Food in the Last Year:    Transportation Needs:     Lack of Transportation (Medical):      Lack of Transportation (Non-Medical):    Physical Activity:     Days of Exercise per Week:     Minutes of Exercise per Session:    Stress:     Feeling of Stress :    Social Connections:     Frequency of Communication with Friends and Family:     Frequency of Social Gatherings with Friends and Family:     Attends Church Services:     Active Member of Clubs or Organizations:     Attends Club or Organization Meetings:     Marital Status:    Intimate Partner Violence:     Fear of Current or Ex-Partner:     Emotionally Abused:     Physically Abused:     Sexually Abused:        SCREENINGS                        PHYSICAL EXAM    (up to 7 for level 4, 8 or more for level 5)     ED Triage Vitals [06/12/21 1015]   BP Temp Temp Source Pulse Resp SpO2 Height Weight   123/75 97.7 °F (36.5 °C) Tympanic 99 20 96 % -- --       Physical Exam  Vitals and nursing note reviewed. Exam conducted with a chaperone present. Constitutional:       Comments: Slightly anxious   HENT:      Head: Normocephalic and atraumatic. Nose: Nose normal.      Mouth/Throat:      Mouth: Mucous membranes are moist.   Eyes:      Extraocular Movements: Extraocular movements intact. Pupils: Pupils are equal, round, and reactive to light. Cardiovascular:      Rate and Rhythm: Normal rate and regular rhythm. Pulses: Normal pulses. Heart sounds: Normal heart sounds. Pulmonary:      Effort: Pulmonary effort is normal.      Breath sounds: Normal breath sounds. Abdominal:      General: Abdomen is flat. Palpations: Abdomen is soft. Tenderness: There is no abdominal tenderness. Musculoskeletal:         General: No signs of injury. Normal range of motion. Cervical back: Normal range of motion. Right lower leg: No edema. Left lower leg: No edema. Skin:     General: Skin is warm. Capillary Refill: Capillary refill takes less than 2 seconds. Findings: No erythema or lesion. Neurological:      General: No focal deficit present. Mental Status: He is alert. Motor: No weakness.       Coordination: Coordination normal.   Psychiatric:         Judgment: Judgment normal.      Comments: Anxious, not suicidal, not homicidal         DIAGNOSTIC RESULTS     EKG: All EKG's are interpreted by the Emergency Department Physician who either signs or Co-signs this chart in the absence of a cardiologist.      RADIOLOGY:   Non-plain film images such as CT, Ultrasound and MRI are read by the radiologist. Plain radiographic images are visualized and preliminarily interpreted by the emergency physician with the below findings:      Interpretation per the Radiologist below, if available at the time of this note:    No orders to display         ED BEDSIDE ULTRASOUND:   Performed by ED Physician - none    LABS:  Labs Reviewed   CBC WITH AUTO DIFFERENTIAL - Abnormal; Notable for the following components:       Result Value    Seg Neutrophils 74 (*)     Lymphocytes 20 (*)     Eosinophils % 0 (*)     Immature Granulocytes 1 (*)     All other components within normal limits   COMPREHENSIVE METABOLIC PANEL W/ REFLEX TO MG FOR LOW K - Abnormal; Notable for the following components:    Glucose 149 (*)     Anion Gap 8 (*)     Alkaline Phosphatase 146 (*)     ALT 42 (*)     Total Protein 8.5 (*)     All other components within normal limits   MAGNESIUM   CK       All other labs were within normal range or not returned as of this dictation. EMERGENCY DEPARTMENT COURSE and DIFFERENTIAL DIAGNOSIS/MDM:   Vitals:    Vitals:    06/12/21 1015   BP: 123/75   Pulse: 99   Resp: 20   Temp: 97.7 °F (36.5 °C)   TempSrc: Tympanic   SpO2: 96%         MDM  Number of Diagnoses or Management Options  Anxiety state  Diagnosis management comments: 55-year-old gentleman presents emergency department breathing that he feels quite anxious. He denies any suicidal homicidal ideations. Admits no shortness of breath denies any chest discomfort. There is been no history of syncopal episodes. Diagnosis considered-acute anxiety, secondary to electrolyte imbalance-no signs or symptomatology consistent with pulmonary embolism or dysrhythmia    Patient did receive 1 mg Ativan after which were much better.     Patient does have follow-up appointment [facility] (Dr. Jim Paz) which she is advised to

## 2021-06-13 LAB
EKG ATRIAL RATE: 82 BPM
EKG P AXIS: 59 DEGREES
EKG P-R INTERVAL: 158 MS
EKG Q-T INTERVAL: 380 MS
EKG QRS DURATION: 92 MS
EKG QTC CALCULATION (BAZETT): 443 MS
EKG R AXIS: -13 DEGREES
EKG T AXIS: 9 DEGREES
EKG VENTRICULAR RATE: 82 BPM

## 2021-06-13 PROCEDURE — 93010 ELECTROCARDIOGRAM REPORT: CPT | Performed by: INTERNAL MEDICINE

## 2021-06-13 ASSESSMENT — ENCOUNTER SYMPTOMS
COLOR CHANGE: 0
SHORTNESS OF BREATH: 0
VOMITING: 0
NAUSEA: 0
ABDOMINAL PAIN: 0
CHEST TIGHTNESS: 0

## 2022-05-11 ENCOUNTER — HOSPITAL ENCOUNTER (EMERGENCY)
Age: 37
Discharge: HOME | End: 2022-05-11
Attending: EMERGENCY MEDICINE
Payer: MEDICAID

## 2022-05-11 VITALS
OXYGEN SATURATION: 98 % | HEART RATE: 130 BPM | SYSTOLIC BLOOD PRESSURE: 125 MMHG | RESPIRATION RATE: 20 BRPM | TEMPERATURE: 99 F | DIASTOLIC BLOOD PRESSURE: 99 MMHG

## 2022-05-11 DIAGNOSIS — R11.11 VOMITING WITHOUT NAUSEA, INTRACTABILITY OF VOMITING NOT SPECIFIED, UNSPECIFIED VOMITING TYPE: Primary | ICD-10-CM

## 2022-05-11 DIAGNOSIS — R44.0 HEARING VOICES: ICD-10-CM

## 2022-05-11 PROCEDURE — 99282 EMERGENCY DEPT VISIT SF MDM: CPT

## 2022-05-11 ASSESSMENT — PAIN - FUNCTIONAL ASSESSMENT: PAIN_FUNCTIONAL_ASSESSMENT: NONE - DENIES PAIN

## 2022-05-11 NOTE — ED PROVIDER NOTES
[facility] COMPLAINT   Chief Complaint   Patient presents with    Hallucinations     onset 7 years ago, patient states that it has been getting worse, states voice is trying to irritate him      HPI   Andra Cockayne is a 40 y.o. male who presents with complaint of hearing voices in his left ear for 7 years. He says are a lot worse last month and there is try to get more riled up and angry. He thinks there is some sort electromagnetic device in there and has had some scans for the did not show anything but he thinks it is because they were not looking at the right thing. I explained to him that he should try and think of a test that would prove to him that he does not does not have it and may we could order it but he could not think of anything. He also notes that he has been having some vomiting over the last month. Probably 15 times. It feels like there is an electrical shock in his epigastrium and then he vomits a lot and then he feels better after he finishes. I encouraged him to follow-up with Dr Robe Cadena for an upper endoscopy. No other current complaints. REVIEW OF SYSTEMS   Psychiatric: + Auditory hallucinations or homicidal Ideations  Respiratory:Denies difficulty breathing or new cough  General:Denies fevers  Neurologic:Denies Headache or syncope  GI: No vomiting or abdominal pain  See HPI for further details. All other review of systems otherwise negative.    PAST MEDICAL & SURGICALHISTORY   Past Medical History:   Diagnosis Date    Anxiety     Depression     Headache      Past Surgical History:   Procedure Laterality Date    ADENOIDECTOMY        CURRENT MEDICATIONS   Current Outpatient Rx   Medication Sig Dispense Refill    paliperidone (INVEGA) 1.5 MG extended release tablet Take 1.5 mg by mouth daily       buPROPion (WELLBUTRIN XL) 150 MG extended release tablet 150 mg every morning       Multiple Vitamins-Minerals (THERAPEUTIC MULTIVITAMIN-MINERALS) tablet Take 1 tablet by mouth daily      escitalopram (LEXAPRO) 20 MG tablet 20 mg daily       buprenorphine-naloxone (SUBOXONE) 12-3 MG sublingual film 1 Film daily. ALLERGIES   No Known Allergies   SOCIAL & FAMILYHISTORY   Social History     Socioeconomic History    Marital status: Single     Spouse name: None    Number of children: None    Years of education: None    Highest education level: None   Occupational History    None   Tobacco Use    Smoking status: Former Smoker     Packs/day: 1.00    Smokeless tobacco: Never Used   Vaping Use    Vaping Use: Every day    Substances: Nicotine   Substance and Sexual Activity    Alcohol use: No    Drug use: Not Currently     Comment: suboxone, speed    Sexual activity: Yes     Partners: Female   Other Topics Concern    None   Social History Narrative    None     Social Determinants of Health     Financial Resource Strain:     Difficulty of Paying Living Expenses: Not on file   Food Insecurity:     Worried About Running Out of Food in the Last Year: Not on file    Vannessa of Food in the Last Year: Not on file   Transportation Needs:     Lack of Transportation (Medical): Not on file    Lack of Transportation (Non-Medical):  Not on file   Physical Activity:     Days of Exercise per Week: Not on file    Minutes of Exercise per Session: Not on file   Stress:     Feeling of Stress : Not on file   Social Connections:     Frequency of Communication with Friends and Family: Not on file    Frequency of Social Gatherings with Friends and Family: Not on file    Attends Mosque Services: Not on file    Active Member of Clubs or Organizations: Not on file    Attends Club or Organization Meetings: Not on file    Marital Status: Not on file   Intimate Partner Violence:     Fear of Current or Ex-Partner: Not on file    Emotionally Abused: Not on file    Physically Abused: Not on file    Sexually Abused: Not on file Housing Stability:     Unable to Pay for Housing in the Last Year: Not on file    Number of Places Lived in the Last Year: Not on file    Unstable Housing in the Last Year: Not on file     History reviewed. No pertinent family history. PHYSICAL EXAM   VITAL SIGNS: BP (!) 125/99   Pulse 130   Temp 99 °F (37.2 °C) (Tympanic)   Resp 20   SpO2 98%   Constitutional: Well developed, well nourished, no acute distress  Eyes: PERRL, conjunctiva normal   HENT: Atraumatic, external ears normal, left TM and right TM normal  Neck: supple, No JVD  Respiratory: No respiratory distress  Cardiovascular: Normal rate  GI: Soft, nondistendedr  Musculoskeletal: No edema, no deformities   Integument: Well hydrated   Neurologi seems to have a delusion about hearing voices. I saw no devices in his head that would explain it. C: Awake alert and oriented, no slurred speech, normal gross motor coordination and strength. Normal Gait. CN 3-12 intatct. Psychiatric: animated affect, does make good eye contact, seems to have fixed delusion of hearing voices      ED COURSE & MEDICAL DECISION MAKING   Pertinent Labs studies reviewed and interpreted. (See chart for details)   Consultation: Mental health Professional consulted in the emergency Department   Differential diagnoses: Metabolic emergency, infection, primary neurologic emergency, psychosis, behavioral psychiatric problem, toxidrome, illicit drug use, other  The patient is medically stable    FINAL IMPRESSION   1. Vomiting without nausea, intractability of vomiting not specified, unspecified vomiting type    2.  Hearing voices        PLAN  Home with follow up      Electronically signed by: Darian Sheppard MD, 5/11/2022 7:55 PM  (This note was completed with a voice recognition program)            Darian Sheppard MD  05/11/22 1958

## 2022-05-18 ENCOUNTER — TELEPHONE (OUTPATIENT)
Dept: SURGERY | Age: 37
End: 2022-05-18

## 2022-05-18 NOTE — TELEPHONE ENCOUNTER
Per Jose Armando, spoke with patient, patient phoned office requesing EGD/colonoscopy. Patient informed both providers schedules are currently closed for scheduling at this time. Patient informed of Dr. Silvia Moralez starting in July. Patient would like to see Dr. Silvia Moralez, patient was placed on wait list. This writer attempted to contact patient to inform he would need referral from PCP due to not being \"of age\" for colonoscopy. No answer, unable to LVM. Will attempt to contact at a later time.

## 2022-05-27 NOTE — TELEPHONE ENCOUNTER
Spoke with patient to inform he needed a referral from PCP. Patient states he does not want colonoscopy and never wanted a colonoscopy. Patient stated he vomited 15 times last month and just wanted to make sure his stomach was okay. Informed patient to have PCP fax referral to Dr. Mariana Loaiza. Voiced understanding.

## 2024-05-04 ENCOUNTER — HOSPITAL ENCOUNTER (EMERGENCY)
Age: 39
Discharge: HOME | End: 2024-05-04
Attending: STUDENT IN AN ORGANIZED HEALTH CARE EDUCATION/TRAINING PROGRAM
Payer: MEDICAID

## 2024-05-04 VITALS
SYSTOLIC BLOOD PRESSURE: 158 MMHG | HEART RATE: 100 BPM | TEMPERATURE: 98.2 F | OXYGEN SATURATION: 98 % | RESPIRATION RATE: 18 BRPM | DIASTOLIC BLOOD PRESSURE: 105 MMHG

## 2024-05-04 DIAGNOSIS — F41.1 ANXIETY STATE: Primary | ICD-10-CM

## 2024-05-04 PROCEDURE — 6370000000 HC RX 637 (ALT 250 FOR IP): Performed by: STUDENT IN AN ORGANIZED HEALTH CARE EDUCATION/TRAINING PROGRAM

## 2024-05-04 PROCEDURE — 99283 EMERGENCY DEPT VISIT LOW MDM: CPT

## 2024-05-04 RX ORDER — LORAZEPAM 1 MG/1
1 TABLET ORAL ONCE
Status: COMPLETED | OUTPATIENT
Start: 2024-05-04 | End: 2024-05-04

## 2024-05-04 RX ORDER — HYDROXYZINE HYDROCHLORIDE 25 MG/1
25 TABLET, FILM COATED ORAL EVERY 8 HOURS PRN
Qty: 30 TABLET | Refills: 0 | Status: SHIPPED | OUTPATIENT
Start: 2024-05-04 | End: 2024-05-14

## 2024-05-04 RX ADMIN — LORAZEPAM 1 MG: 1 TABLET ORAL at 21:10

## 2024-05-04 ASSESSMENT — PAIN - FUNCTIONAL ASSESSMENT: PAIN_FUNCTIONAL_ASSESSMENT: NONE - DENIES PAIN

## 2024-05-05 NOTE — ED PROVIDER NOTES
[facility] ED  Emergency Department Encounter  Emergency Medicine Attending     Pt Name:Chandler Miller  MRN: [id number]  Birthdate 1985  Date of evaluation: 5/4/24  PCP:  Leonora Cespedes APRN - NP  Note Started: 9:38 PM EDT      CHIEF COMPLAINT       Chief Complaint   Patient presents with    Anxiety     Patient stated he has not slept in 3 days having increased anxiety. No predisposing factors       HISTORY OF PRESENT ILLNESS  (Location/Symptom, Timing/Onset, Context/Setting, Quality, Duration, Modifying Factors, Severity.)      Chandler Miller is a 39 y.o. male who presents with insomnia and anxiety.  Patient states he has not slept well since Tuesday.  Patient states that he takes his medication on occasion and occasionally he has to come out to the ER for assistance with his anxiety.  Patient states that occasionally he gets Ativan that seems to help calm him.  Patient denies any other symptoms    PAST MEDICAL / SURGICAL / SOCIAL / FAMILY HISTORY      has a past medical history of Anxiety, Depression, and Headache.       has a past surgical history that includes Adenoidectomy.      Social History     Socioeconomic History    Marital status: Single     Spouse name: Not on file    Number of children: Not on file    Years of education: Not on file    Highest education level: Not on file   Occupational History    Not on file   Tobacco Use    Smoking status: Former     Current packs/day: 1.00     Types: Cigarettes    Smokeless tobacco: Never   Vaping Use    Vaping Use: Every day    Substances: Nicotine   Substance and Sexual Activity    Alcohol use: No    Drug use: Not Currently     Comment: suboxone, speed    Sexual activity: Yes     Partners: Female   Other Topics Concern    Not on file   Social History Narrative    Not on file     Social Determinants of Health     Financial Resource Strain: Not on file   Food Insecurity: Not on file   Transportation Needs: Not on file   Physical

## 2024-05-05 NOTE — DISCHARGE INSTRUCTIONS
You were evaluated in the emergency department for the inability to sleep and insomnia increasing her anxiety.  Please take the medications as prescribed to you to help as a sleep aid.  Please follow-up with your primary care doctor.  If you have any new or worsening symptoms please come to the emergency department immediately for reevaluation.

## 2024-06-24 ENCOUNTER — HOSPITAL ENCOUNTER (EMERGENCY)
Age: 39
Discharge: HOME | End: 2024-06-24
Payer: MEDICAID

## 2024-06-24 VITALS
OXYGEN SATURATION: 95 % | RESPIRATION RATE: 18 BRPM | HEART RATE: 89 BPM | DIASTOLIC BLOOD PRESSURE: 86 MMHG | SYSTOLIC BLOOD PRESSURE: 147 MMHG | TEMPERATURE: 98 F

## 2024-06-24 DIAGNOSIS — F41.9 ANXIETY: Primary | ICD-10-CM

## 2024-06-24 DIAGNOSIS — R74.8 ELEVATED LIVER ENZYMES: ICD-10-CM

## 2024-06-24 DIAGNOSIS — Z86.19 HX OF HEPATITIS C: ICD-10-CM

## 2024-06-24 LAB
ALBUMIN SERPL-MCNC: 4.7 G/DL (ref 3.5–5.2)
ALBUMIN/GLOB SERPL: 1.1 {RATIO} (ref 1–2.5)
ALP SERPL-CCNC: 153 U/L (ref 40–129)
ALT SERPL-CCNC: 138 U/L (ref 5–41)
ANION GAP SERPL CALCULATED.3IONS-SCNC: 14 MMOL/L (ref 9–17)
AST SERPL-CCNC: 75 U/L
BASOPHILS # BLD: 0.03 K/UL (ref 0–0.2)
BASOPHILS NFR BLD: 0 % (ref 0–2)
BILIRUB SERPL-MCNC: 0.9 MG/DL (ref 0.3–1.2)
BUN SERPL-MCNC: 17 MG/DL (ref 6–20)
BUN/CREAT SERPL: 21 (ref 9–20)
CALCIUM SERPL-MCNC: 9.5 MG/DL (ref 8.6–10.4)
CHLORIDE SERPL-SCNC: 97 MMOL/L (ref 98–107)
CO2 SERPL-SCNC: 24 MMOL/L (ref 20–31)
CREAT SERPL-MCNC: 0.8 MG/DL (ref 0.7–1.2)
EOSINOPHIL # BLD: 0.07 K/UL (ref 0–0.44)
EOSINOPHILS RELATIVE PERCENT: 1 % (ref 1–4)
ERYTHROCYTE [DISTWIDTH] IN BLOOD BY AUTOMATED COUNT: 11.3 % (ref 11.8–14.4)
GFR, ESTIMATED: >90 ML/MIN/1.73M2
GLUCOSE SERPL-MCNC: 122 MG/DL (ref 70–99)
HCT VFR BLD AUTO: 47.7 % (ref 40.7–50.3)
HGB BLD-MCNC: 17.5 G/DL (ref 13–17)
IMM GRANULOCYTES # BLD AUTO: 0.05 K/UL (ref 0–0.3)
IMM GRANULOCYTES NFR BLD: 0 %
LYMPHOCYTES NFR BLD: 1.49 K/UL (ref 1.1–3.7)
LYMPHOCYTES RELATIVE PERCENT: 13 % (ref 24–43)
MAGNESIUM SERPL-MCNC: 2.3 MG/DL (ref 1.6–2.6)
MCH RBC QN AUTO: 30.8 PG (ref 25.2–33.5)
MCHC RBC AUTO-ENTMCNC: 36.7 G/DL (ref 28.4–34.8)
MCV RBC AUTO: 84 FL (ref 82.6–102.9)
MONOCYTES NFR BLD: 0.51 K/UL (ref 0.1–1.2)
MONOCYTES NFR BLD: 5 % (ref 3–12)
NEUTROPHILS NFR BLD: 81 % (ref 36–65)
NEUTS SEG NFR BLD: 9.19 K/UL (ref 1.5–8.1)
NRBC BLD-RTO: 0 PER 100 WBC
PLATELET # BLD AUTO: 268 K/UL (ref 138–453)
PMV BLD AUTO: 9 FL (ref 8.1–13.5)
POTASSIUM SERPL-SCNC: 4.3 MMOL/L (ref 3.7–5.3)
PROT SERPL-MCNC: 9.1 G/DL (ref 6.4–8.3)
RBC # BLD AUTO: 5.68 M/UL (ref 4.21–5.77)
SODIUM SERPL-SCNC: 135 MMOL/L (ref 135–144)
TSH SERPL DL<=0.05 MIU/L-ACNC: 0.59 UIU/ML (ref 0.3–5)
WBC OTHER # BLD: 11.3 K/UL (ref 3.5–11.3)

## 2024-06-24 PROCEDURE — 84439 ASSAY OF FREE THYROXINE: CPT

## 2024-06-24 PROCEDURE — 85025 COMPLETE CBC W/AUTO DIFF WBC: CPT

## 2024-06-24 PROCEDURE — 99283 EMERGENCY DEPT VISIT LOW MDM: CPT

## 2024-06-24 PROCEDURE — 80053 COMPREHEN METABOLIC PANEL: CPT

## 2024-06-24 PROCEDURE — 84443 ASSAY THYROID STIM HORMONE: CPT

## 2024-06-24 PROCEDURE — 83735 ASSAY OF MAGNESIUM: CPT

## 2024-06-24 PROCEDURE — 6370000000 HC RX 637 (ALT 250 FOR IP): Performed by: NURSE PRACTITIONER

## 2024-06-24 PROCEDURE — 36416 COLLJ CAPILLARY BLOOD SPEC: CPT

## 2024-06-24 RX ORDER — HYDROXYZINE HYDROCHLORIDE 25 MG/1
25 TABLET, FILM COATED ORAL EVERY 8 HOURS PRN
Qty: 30 TABLET | Refills: 0 | Status: SHIPPED | OUTPATIENT
Start: 2024-06-24 | End: 2024-07-04

## 2024-06-24 RX ORDER — LORAZEPAM 1 MG/1
1 TABLET ORAL ONCE
Status: COMPLETED | OUTPATIENT
Start: 2024-06-24 | End: 2024-06-24

## 2024-06-24 RX ORDER — HYDROXYZINE HYDROCHLORIDE 25 MG/1
25 TABLET, FILM COATED ORAL ONCE
Status: COMPLETED | OUTPATIENT
Start: 2024-06-24 | End: 2024-06-24

## 2024-06-24 RX ADMIN — LORAZEPAM 1 MG: 1 TABLET ORAL at 19:45

## 2024-06-24 RX ADMIN — HYDROXYZINE HYDROCHLORIDE 25 MG: 25 TABLET, FILM COATED ORAL at 20:41

## 2024-06-24 ASSESSMENT — PAIN - FUNCTIONAL ASSESSMENT: PAIN_FUNCTIONAL_ASSESSMENT: NONE - DENIES PAIN

## 2024-06-24 NOTE — ED PROVIDER NOTES
[facility] ED  EMERGENCY DEPARTMENT ENCOUNTER      Pt Name: Chandler Miller  MRN: [id number]  Birthdate 1985  Date of evaluation: 6/24/2024  Provider: KAR Ramirez CNP  9:31 PM    CHIEF COMPLAINT       Chief Complaint   Patient presents with    Anxiety     Patient states he has not eaten or slept in three days.          HISTORY OF PRESENT ILLNESS        Chandler Miller 39-year-old presents from home to ED with complaint of increasing anxiety.  He reports he has not eaten or slept in 3 days.  Patient denies recent drug use.  Patient states he does intermittently vape.  Patient reports he does have history of anxiety but no other formal mental health diagnosis.  Patient is not seeking admission or inpatient mental health admission.  Patient reports he was seen here on May 4, 2024 with similar symptoms he was given hydroxyzine with some improvement.  Patient reports to his knowledge he has never had thyroid evaluation completed.  Patient denies any new stressors, medications or recreational drug use.Hx of anxiety, depression, headache and Hepatitis C.    The history is provided by the patient. No  was used.       Nursing Notes were reviewed.    REVIEW OF SYSTEMS       Review of Systems   Psychiatric/Behavioral:  Positive for sleep disturbance. The patient is nervous/anxious.    All other systems reviewed and are negative.      Except as noted above the remainder of the review of systems was reviewed and negative.       PAST MEDICAL HISTORY     Past Medical History:   Diagnosis Date    Anxiety     Depression     Headache          SURGICAL HISTORY       Past Surgical History:   Procedure Laterality Date    ADENOIDECTOMY           CURRENT MEDICATIONS       Previous Medications    BUPRENORPHINE-NALOXONE (SUBOXONE) 12-3 MG SUBLINGUAL FILM    1 Film daily.     BUPROPION (WELLBUTRIN XL) 150 MG EXTENDED RELEASE TABLET    150 mg every morning     ESCITALOPRAM (LEXAPRO) 20

## 2024-06-25 ENCOUNTER — HOSPITAL ENCOUNTER (EMERGENCY)
Age: 39
Discharge: HOME | End: 2024-06-25
Attending: EMERGENCY MEDICINE
Payer: MEDICAID

## 2024-06-25 VITALS
TEMPERATURE: 98.1 F | OXYGEN SATURATION: 93 % | RESPIRATION RATE: 16 BRPM | HEIGHT: 66 IN | DIASTOLIC BLOOD PRESSURE: 76 MMHG | SYSTOLIC BLOOD PRESSURE: 119 MMHG | BODY MASS INDEX: 31.66 KG/M2 | WEIGHT: 197 LBS | HEART RATE: 99 BPM

## 2024-06-25 DIAGNOSIS — F41.9 ANXIETY: Primary | ICD-10-CM

## 2024-06-25 LAB — T4 FREE SERPL-MCNC: 1.4 NG/DL (ref 0.92–1.68)

## 2024-06-25 PROCEDURE — 6370000000 HC RX 637 (ALT 250 FOR IP): Performed by: EMERGENCY MEDICINE

## 2024-06-25 PROCEDURE — 99283 EMERGENCY DEPT VISIT LOW MDM: CPT

## 2024-06-25 RX ORDER — DIPHENHYDRAMINE HCL 25 MG
25 CAPSULE ORAL ONCE
Status: COMPLETED | OUTPATIENT
Start: 2024-06-25 | End: 2024-06-25

## 2024-06-25 RX ORDER — LORAZEPAM 1 MG/1
1 TABLET ORAL ONCE
Status: COMPLETED | OUTPATIENT
Start: 2024-06-25 | End: 2024-06-25

## 2024-06-25 RX ADMIN — LORAZEPAM 1 MG: 1 TABLET ORAL at 09:42

## 2024-06-25 RX ADMIN — DIPHENHYDRAMINE HYDROCHLORIDE 25 MG: 25 CAPSULE ORAL at 09:42

## 2024-06-25 ASSESSMENT — LIFESTYLE VARIABLES
HOW MANY STANDARD DRINKS CONTAINING ALCOHOL DO YOU HAVE ON A TYPICAL DAY: PATIENT DOES NOT DRINK
HOW OFTEN DO YOU HAVE A DRINK CONTAINING ALCOHOL: NEVER

## 2024-06-25 ASSESSMENT — PAIN - FUNCTIONAL ASSESSMENT: PAIN_FUNCTIONAL_ASSESSMENT: PAIN ASSESSMENT IN ADVANCED DEMENTIA (PAINAD)

## 2024-06-25 NOTE — ED PROVIDER NOTES
Head: Normocephalic and atraumatic.   Cardiovascular:      Rate and Rhythm: Normal rate and regular rhythm.   Pulmonary:      Effort: Pulmonary effort is normal. No respiratory distress.      Breath sounds: Normal breath sounds.   Skin:     General: Skin is warm and dry.      Findings: No rash.   Neurological:      General: No focal deficit present.      Mental Status: He is alert and oriented to person, place, and time.         DIAGNOSTIC RESULTS     EKG: All EKG's are interpreted by the Emergency Department Physician who either signs or Co-signs this chart in the absence of a cardiologist.        RADIOLOGY:   Non-plain film images such as CT, Ultrasound and MRI are read by the radiologist. Plain radiographic images are visualized and preliminarily interpreted by the emergency physician with the below findings:        Interpretation per the Radiologist below, if available at the time of this note:    No orders to display         ED BEDSIDE ULTRASOUND:   Performed by ED Physician - none    LABS:  Labs Reviewed - No data to display    All other labs were within normal range or not returned as of this dictation.    EMERGENCY DEPARTMENT COURSE and DIFFERENTIAL DIAGNOSIS/MDM:   Vitals:    Vitals:    06/25/24 0843 06/25/24 0844 06/25/24 1015 06/25/24 1030   BP: 137/84  119/76    Pulse: 99      Resp: 16      Temp:  98.1 °F (36.7 °C)     SpO2: 97%  95% 93%   Weight: 89.4 kg (197 lb)      Height: 1.676 m (5' 6\")              MDM  Number of Diagnoses or Management Options  Anxiety  Diagnosis management comments: Patient did receive relief yesterday with Ativan and hydroxyzine.  I will repeat those medications with strict instructions that he must  his hydroxyzine prescription this morning.  He is also instructed that he must call his psychiatrist office today for reevaluation and possible medication adjustment.  Home care ED return and follow-up discussed.  Patient is stable for discharge home at this time and in  agreement with plan        MIPS       REASSESSMENT          CRITICAL CARE TIME   Total Critical Care time was  minutes, excluding separately reportable procedures.  There was a high probability of clinically significant/life threatening deterioration in the patient's condition which required my urgent intervention.      CONSULTS:  None    PROCEDURES:  Unless otherwise noted below, none     Procedures        FINAL IMPRESSION      1. Anxiety          DISPOSITION/PLAN   DISPOSITION Decision To Discharge 06/25/2024 10:19:52 AM      PATIENT REFERRED TO:    Call your psychiatrist this morning to schedule the earliest available follow-up          DISCHARGE MEDICATIONS:  Discharge Medication List as of 6/25/2024 10:20 AM        Controlled Substances Monitoring:          No data to display                (Please note that portions of this note were completed with a voice recognition program.  Efforts were made to edit the dictations but occasionally words are mis-transcribed.)    Hortencia Barraza MD (electronically signed)  Attending Emergency Physician            Hortencia Barraza MD  06/29/24 0986

## 2024-06-25 NOTE — DISCHARGE INSTRUCTIONS
Increase fluid  Avoid Tylenol and alcohol  Hydroxyzine 25 mg 1 by mouth every 8 hours as needed for anxiety

## 2024-06-25 NOTE — ED TRIAGE NOTES
Pt c/o anxiety attack. \"Anxiety came back after leaving ER\", seen last night, hx of anxiety- took self off of medications

## 2024-06-25 NOTE — DISCHARGE INSTRUCTIONS
Continue your current medication as prescribed.  You must fill your hydroxyzine prescription this morning and take as directed and needed.  Follow-up with your psychiatrist as soon as possible call this morning to schedule appointment.  Please return for any acute concerns

## 2024-09-15 ENCOUNTER — HOSPITAL ENCOUNTER (EMERGENCY)
Age: 39
Discharge: HOME | End: 2024-09-15
Attending: EMERGENCY MEDICINE
Payer: MEDICAID

## 2024-09-15 ENCOUNTER — APPOINTMENT (OUTPATIENT)
Dept: GENERAL RADIOLOGY | Age: 39
End: 2024-09-15
Payer: MEDICAID

## 2024-09-15 VITALS
OXYGEN SATURATION: 98 % | SYSTOLIC BLOOD PRESSURE: 167 MMHG | TEMPERATURE: 98.1 F | DIASTOLIC BLOOD PRESSURE: 70 MMHG | HEART RATE: 106 BPM | RESPIRATION RATE: 16 BRPM

## 2024-09-15 DIAGNOSIS — F41.1 ANXIETY STATE: Primary | ICD-10-CM

## 2024-09-15 LAB
ANION GAP SERPL CALCULATED.3IONS-SCNC: 13 MMOL/L (ref 9–16)
BASOPHILS # BLD: <0.03 K/UL (ref 0–0.2)
BASOPHILS NFR BLD: 0 % (ref 0–2)
BUN SERPL-MCNC: 15 MG/DL (ref 6–20)
BUN/CREAT SERPL: 15 (ref 9–20)
CALCIUM SERPL-MCNC: 10.2 MG/DL (ref 8.6–10.4)
CHLORIDE SERPL-SCNC: 95 MMOL/L (ref 98–107)
CO2 SERPL-SCNC: 26 MMOL/L (ref 20–31)
CREAT SERPL-MCNC: 1 MG/DL (ref 0.7–1.2)
D DIMER PPP FEU-MCNC: <0.27 UG/ML FEU (ref 0–0.59)
EKG ATRIAL RATE: 75 BPM
EKG P AXIS: 51 DEGREES
EKG P-R INTERVAL: 162 MS
EKG Q-T INTERVAL: 412 MS
EKG QRS DURATION: 86 MS
EKG QTC CALCULATION (BAZETT): 460 MS
EKG R AXIS: -27 DEGREES
EKG T AXIS: 21 DEGREES
EKG VENTRICULAR RATE: 75 BPM
EOSINOPHIL # BLD: 0.09 K/UL (ref 0–0.44)
EOSINOPHILS RELATIVE PERCENT: 1 % (ref 1–4)
ERYTHROCYTE [DISTWIDTH] IN BLOOD BY AUTOMATED COUNT: 12 % (ref 11.8–14.4)
GFR, ESTIMATED: >90 ML/MIN/1.73M2
GLUCOSE SERPL-MCNC: 140 MG/DL (ref 74–99)
HCT VFR BLD AUTO: 45.6 % (ref 40.7–50.3)
HGB BLD-MCNC: 16.3 G/DL (ref 13–17)
IMM GRANULOCYTES # BLD AUTO: 0.03 K/UL (ref 0–0.3)
IMM GRANULOCYTES NFR BLD: 0 %
LYMPHOCYTES NFR BLD: 1.23 K/UL (ref 1.1–3.7)
LYMPHOCYTES RELATIVE PERCENT: 11 % (ref 24–43)
MCH RBC QN AUTO: 30.4 PG (ref 25.2–33.5)
MCHC RBC AUTO-ENTMCNC: 35.7 G/DL (ref 28.4–34.8)
MCV RBC AUTO: 84.9 FL (ref 82.6–102.9)
MONOCYTES NFR BLD: 0.44 K/UL (ref 0.1–1.2)
MONOCYTES NFR BLD: 4 % (ref 3–12)
NEUTROPHILS NFR BLD: 84 % (ref 36–65)
NEUTS SEG NFR BLD: 9.75 K/UL (ref 1.5–8.1)
NRBC BLD-RTO: 0 PER 100 WBC
PLATELET # BLD AUTO: 257 K/UL (ref 138–453)
PMV BLD AUTO: 9 FL (ref 8.1–13.5)
POTASSIUM SERPL-SCNC: 4.2 MMOL/L (ref 3.7–5.3)
RBC # BLD AUTO: 5.37 M/UL (ref 4.21–5.77)
SODIUM SERPL-SCNC: 134 MMOL/L (ref 136–145)
TROPONIN I SERPL HS-MCNC: <6 NG/L (ref 0–22)
WBC OTHER # BLD: 11.6 K/UL (ref 3.5–11.3)

## 2024-09-15 PROCEDURE — 85379 FIBRIN DEGRADATION QUANT: CPT

## 2024-09-15 PROCEDURE — 80048 BASIC METABOLIC PNL TOTAL CA: CPT

## 2024-09-15 PROCEDURE — 93005 ELECTROCARDIOGRAM TRACING: CPT | Performed by: EMERGENCY MEDICINE

## 2024-09-15 PROCEDURE — 85025 COMPLETE CBC W/AUTO DIFF WBC: CPT

## 2024-09-15 PROCEDURE — 99285 EMERGENCY DEPT VISIT HI MDM: CPT

## 2024-09-15 PROCEDURE — 71045 X-RAY EXAM CHEST 1 VIEW: CPT

## 2024-09-15 PROCEDURE — 93010 ELECTROCARDIOGRAM REPORT: CPT | Performed by: INTERNAL MEDICINE

## 2024-09-15 PROCEDURE — 84484 ASSAY OF TROPONIN QUANT: CPT

## 2024-09-15 PROCEDURE — 96374 THER/PROPH/DIAG INJ IV PUSH: CPT

## 2024-09-15 PROCEDURE — 6360000002 HC RX W HCPCS

## 2024-09-15 RX ORDER — LORAZEPAM 2 MG/ML
INJECTION INTRAMUSCULAR
Status: COMPLETED
Start: 2024-09-15 | End: 2024-09-15

## 2024-09-15 RX ORDER — LORAZEPAM 2 MG/ML
1 INJECTION INTRAMUSCULAR ONCE
Status: COMPLETED | OUTPATIENT
Start: 2024-09-15 | End: 2024-09-15

## 2024-09-15 RX ADMIN — LORAZEPAM 1 MG: 2 INJECTION INTRAMUSCULAR; INTRAVENOUS at 16:12

## 2024-09-15 RX ADMIN — LORAZEPAM 1 MG: 2 INJECTION INTRAMUSCULAR at 16:12

## 2024-09-15 ASSESSMENT — PAIN - FUNCTIONAL ASSESSMENT: PAIN_FUNCTIONAL_ASSESSMENT: NONE - DENIES PAIN

## 2024-11-12 ENCOUNTER — HOSPITAL ENCOUNTER (EMERGENCY)
Age: 39
Discharge: HOME | End: 2024-11-12
Payer: MEDICAID

## 2024-11-12 VITALS
TEMPERATURE: 98.7 F | WEIGHT: 184 LBS | DIASTOLIC BLOOD PRESSURE: 101 MMHG | BODY MASS INDEX: 29.57 KG/M2 | HEIGHT: 66 IN | OXYGEN SATURATION: 99 % | HEART RATE: 67 BPM | SYSTOLIC BLOOD PRESSURE: 144 MMHG | RESPIRATION RATE: 16 BRPM

## 2024-11-12 DIAGNOSIS — F41.1 ANXIETY STATE: Primary | ICD-10-CM

## 2024-11-12 LAB
EKG ATRIAL RATE: 59 BPM
EKG P AXIS: 56 DEGREES
EKG P-R INTERVAL: 166 MS
EKG Q-T INTERVAL: 414 MS
EKG QRS DURATION: 94 MS
EKG QTC CALCULATION (BAZETT): 409 MS
EKG R AXIS: -21 DEGREES
EKG T AXIS: 11 DEGREES
EKG VENTRICULAR RATE: 59 BPM

## 2024-11-12 PROCEDURE — 6370000000 HC RX 637 (ALT 250 FOR IP)

## 2024-11-12 PROCEDURE — 93005 ELECTROCARDIOGRAM TRACING: CPT

## 2024-11-12 PROCEDURE — 93010 ELECTROCARDIOGRAM REPORT: CPT | Performed by: INTERNAL MEDICINE

## 2024-11-12 PROCEDURE — 99283 EMERGENCY DEPT VISIT LOW MDM: CPT

## 2024-11-12 RX ORDER — LORAZEPAM 1 MG/1
1 TABLET ORAL ONCE
Status: COMPLETED | OUTPATIENT
Start: 2024-11-12 | End: 2024-11-12

## 2024-11-12 RX ORDER — HYDROXYZINE HYDROCHLORIDE 25 MG/1
25 TABLET, FILM COATED ORAL ONCE
Status: COMPLETED | OUTPATIENT
Start: 2024-11-12 | End: 2024-11-12

## 2024-11-12 RX ADMIN — HYDROXYZINE HYDROCHLORIDE 25 MG: 25 TABLET, FILM COATED ORAL at 02:08

## 2024-11-12 RX ADMIN — LORAZEPAM 1 MG: 1 TABLET ORAL at 01:18

## 2024-11-12 ASSESSMENT — PAIN - FUNCTIONAL ASSESSMENT: PAIN_FUNCTIONAL_ASSESSMENT: NONE - DENIES PAIN

## 2024-11-12 NOTE — ED PROVIDER NOTES
[facility] ED  EMERGENCY DEPARTMENT ENCOUNTER        Pt Name: Chandler Miller  MRN: [id number]  Birthdate 1985  Date of evaluation: 11/12/2024  Provider: Toyin Falcon MD  PCP: Leonora Cespedes APRN - NP  Note Started: 1:13 AM EST 11/12/24    CHIEF COMPLAINT       Chief Complaint   Patient presents with    Anxiety     Patient states that he has been up for a few days due to anxiety, states he feels like his ears are ringing, he is tingling all over. States he does take Buspar, but it does not seem to help.         HISTORY OF PRESENT ILLNESS: 1 or more Elements     Chandler Miller is a 39 y.o. male who presents with anxiety.  States it has been ongoing for several days.  States he has been taking BuSpar for it but it is not helping.  He now is endorsing significant difficulty with like motions and feels like his whole body is tingling and itching.  Denies any chest pain or belly pain.  Denies any trouble breathing.    Denies any fever, chills, n/v, headache, dizziness, vision changes, neck tenderness or stiffness, weakness, cp, palpitations, leg swelling/tenderness, sob, cough, abd pain, dysuria, hematuria, diarrhea, constipation, bloody stools.  Denies any homicidal or suicidal ideation  Patient is on Suboxone but denies any other drug use.  He states that he does vape denies any alcohol use    Nursing Notes were all reviewed and agreed with or any disagreements were addressed in the HPI.    ROS:   Pertinent positives and negatives are stated within HPI, all other systems reviewed and are negative.      --------------------------------------------- PAST HISTORY ---------------------------------------------  Past Medical History:  has a past medical history of Anxiety, Depression, Headache, and Narcotic abuse (HCC).    Past Surgical History:  has a past surgical history that includes Adenoidectomy.    Social History:  reports that he has quit smoking. His smoking use included cigarettes.

## 2024-11-12 NOTE — DISCHARGE INSTRUCTIONS
Please schedule follow-up visit with your primary care doctor for better medication control for your anxiety.  Referral was given for a psychiatrist.   Return to the ED for any worsening symptoms.

## 2024-11-23 ENCOUNTER — HOSPITAL ENCOUNTER (EMERGENCY)
Age: 39
Discharge: HOME | End: 2024-11-24
Attending: EMERGENCY MEDICINE
Payer: MEDICAID

## 2024-11-23 ENCOUNTER — APPOINTMENT (OUTPATIENT)
Dept: CT IMAGING | Age: 39
End: 2024-11-23
Payer: MEDICAID

## 2024-11-23 VITALS
WEIGHT: 185 LBS | HEART RATE: 54 BPM | OXYGEN SATURATION: 100 % | HEIGHT: 66 IN | TEMPERATURE: 97 F | DIASTOLIC BLOOD PRESSURE: 90 MMHG | BODY MASS INDEX: 29.73 KG/M2 | RESPIRATION RATE: 18 BRPM | SYSTOLIC BLOOD PRESSURE: 137 MMHG

## 2024-11-23 DIAGNOSIS — R51.9 NONINTRACTABLE HEADACHE, UNSPECIFIED CHRONICITY PATTERN, UNSPECIFIED HEADACHE TYPE: Primary | ICD-10-CM

## 2024-11-23 PROCEDURE — 70498 CT ANGIOGRAPHY NECK: CPT

## 2024-11-23 PROCEDURE — 99285 EMERGENCY DEPT VISIT HI MDM: CPT

## 2024-11-23 PROCEDURE — 96372 THER/PROPH/DIAG INJ SC/IM: CPT

## 2024-11-23 PROCEDURE — 6360000004 HC RX CONTRAST MEDICATION: Performed by: EMERGENCY MEDICINE

## 2024-11-23 PROCEDURE — 70450 CT HEAD/BRAIN W/O DYE: CPT

## 2024-11-23 PROCEDURE — 6360000002 HC RX W HCPCS: Performed by: EMERGENCY MEDICINE

## 2024-11-23 RX ORDER — DIPHENHYDRAMINE HYDROCHLORIDE 50 MG/ML
25 INJECTION INTRAMUSCULAR; INTRAVENOUS ONCE
Status: DISCONTINUED | OUTPATIENT
Start: 2024-11-23 | End: 2024-11-23

## 2024-11-23 RX ORDER — IOPAMIDOL 755 MG/ML
75 INJECTION, SOLUTION INTRAVASCULAR
Status: COMPLETED | OUTPATIENT
Start: 2024-11-23 | End: 2024-11-23

## 2024-11-23 RX ORDER — DIPHENHYDRAMINE HYDROCHLORIDE 50 MG/ML
25 INJECTION INTRAMUSCULAR; INTRAVENOUS ONCE
Status: COMPLETED | OUTPATIENT
Start: 2024-11-23 | End: 2024-11-23

## 2024-11-23 RX ORDER — PROCHLORPERAZINE EDISYLATE 5 MG/ML
10 INJECTION INTRAMUSCULAR; INTRAVENOUS ONCE
Status: DISCONTINUED | OUTPATIENT
Start: 2024-11-23 | End: 2024-11-23

## 2024-11-23 RX ORDER — PROCHLORPERAZINE EDISYLATE 5 MG/ML
10 INJECTION INTRAMUSCULAR; INTRAVENOUS ONCE
Status: COMPLETED | OUTPATIENT
Start: 2024-11-23 | End: 2024-11-23

## 2024-11-23 RX ADMIN — IOPAMIDOL 75 ML: 755 INJECTION, SOLUTION INTRAVENOUS at 23:19

## 2024-11-23 RX ADMIN — PROCHLORPERAZINE EDISYLATE 10 MG: 5 INJECTION INTRAMUSCULAR; INTRAVENOUS at 22:49

## 2024-11-23 RX ADMIN — DIPHENHYDRAMINE HYDROCHLORIDE 25 MG: 50 INJECTION INTRAMUSCULAR; INTRAVENOUS at 22:49

## 2024-11-23 ASSESSMENT — PAIN SCALES - GENERAL: PAINLEVEL_OUTOF10: 10

## 2024-11-23 ASSESSMENT — PAIN - FUNCTIONAL ASSESSMENT: PAIN_FUNCTIONAL_ASSESSMENT: 0-10

## 2024-11-24 PROCEDURE — 96374 THER/PROPH/DIAG INJ IV PUSH: CPT

## 2024-11-24 PROCEDURE — 6360000002 HC RX W HCPCS: Performed by: EMERGENCY MEDICINE

## 2024-11-24 RX ORDER — KETOROLAC TROMETHAMINE 30 MG/ML
30 INJECTION, SOLUTION INTRAMUSCULAR; INTRAVENOUS ONCE
Status: COMPLETED | OUTPATIENT
Start: 2024-11-24 | End: 2024-11-24

## 2024-11-24 RX ADMIN — KETOROLAC TROMETHAMINE 30 MG: 30 INJECTION, SOLUTION INTRAMUSCULAR at 00:49

## 2024-11-24 NOTE — DISCHARGE INSTRUCTIONS
Please return to the ER for worsening headache, continued symptoms, per persistent vomiting, fevers, neck stiffness.  Or if any numbness, tingling or weakness develops

## 2024-11-24 NOTE — ED PROVIDER NOTES
on file   Intimate Partner Violence: Not on file   Housing Stability: Not on file       History reviewed. No pertinent family history.    Allergies:  Patient has no known allergies.    Home Medications:  Prior to Admission medications    Medication Sig Start Date End Date Taking? Authorizing Provider   paliperidone (INVEGA) 1.5 MG extended release tablet Take 1.5 mg by mouth daily   Patient not taking: Reported on 6/25/2024 3/31/21   Alan Hill MD   buPROPion (WELLBUTRIN XL) 150 MG extended release tablet 1 tablet every morning 10/28/20   Alan Hill MD   Multiple Vitamins-Minerals (THERAPEUTIC MULTIVITAMIN-MINERALS) tablet Take 1 tablet by mouth daily  Patient not taking: Reported on 6/25/2024    Alan Hill MD   escitalopram (LEXAPRO) 20 MG tablet 20 mg daily   Patient not taking: Reported on 6/25/2024 6/15/20   Alan Hill MD   buprenorphine-naloxone (SUBOXONE) 12-3 MG sublingual film 1 Film daily. 3/5/20   Alan Hill MD         REVIEW OF SYSTEMS       See hpi    PHYSICAL EXAM      INITIAL VITALS:   BP (!) 137/90   Pulse 54   Temp 97 °F (36.1 °C)   Resp 18   Ht 1.676 m (5' 6\")   Wt 83.9 kg (185 lb)   SpO2 100%   BMI 29.86 kg/m²     Physical Exam  Constitutional:       General: He is not in acute distress.     Appearance: Normal appearance. He is not ill-appearing.      Comments: Patient appears uncomfortable, laying on his right side with eyes closed.   HENT:      Head: Normocephalic and atraumatic.   Eyes:      General:         Right eye: No discharge.         Left eye: No discharge.      Extraocular Movements: Extraocular movements intact.      Conjunctiva/sclera: Conjunctivae normal.      Pupils: Pupils are equal, round, and reactive to light.   Cardiovascular:      Rate and Rhythm: Normal rate.   Pulmonary:      Effort: Pulmonary effort is normal. No respiratory distress.      Breath sounds: No stridor. No wheezing, rhonchi or rales.   Chest:

## 2024-11-25 ENCOUNTER — OFFICE VISIT (OUTPATIENT)
Dept: NEUROLOGY | Age: 39
End: 2024-11-25
Payer: MEDICAID

## 2024-11-25 VITALS
SYSTOLIC BLOOD PRESSURE: 132 MMHG | WEIGHT: 183 LBS | RESPIRATION RATE: 18 BRPM | HEART RATE: 98 BPM | BODY MASS INDEX: 29.41 KG/M2 | DIASTOLIC BLOOD PRESSURE: 90 MMHG | TEMPERATURE: 96.5 F | HEIGHT: 66 IN

## 2024-11-25 DIAGNOSIS — R44.0 CONTINUOUS AUDITORY HALLUCINATIONS: Primary | ICD-10-CM

## 2024-11-25 PROCEDURE — G8419 CALC BMI OUT NRM PARAM NOF/U: HCPCS | Performed by: NEUROMUSCULOSKELETAL MEDICINE, SPORTS MEDICINE

## 2024-11-25 PROCEDURE — 1036F TOBACCO NON-USER: CPT | Performed by: NEUROMUSCULOSKELETAL MEDICINE, SPORTS MEDICINE

## 2024-11-25 PROCEDURE — G8484 FLU IMMUNIZE NO ADMIN: HCPCS | Performed by: NEUROMUSCULOSKELETAL MEDICINE, SPORTS MEDICINE

## 2024-11-25 PROCEDURE — 99203 OFFICE O/P NEW LOW 30 MIN: CPT | Performed by: NEUROMUSCULOSKELETAL MEDICINE, SPORTS MEDICINE

## 2024-11-25 PROCEDURE — G8428 CUR MEDS NOT DOCUMENT: HCPCS | Performed by: NEUROMUSCULOSKELETAL MEDICINE, SPORTS MEDICINE

## 2024-11-25 NOTE — PATIENT INSTRUCTIONS
SURVEY:    Thank you for allowing us to care for you today.    You may be receiving a survey from [facility] regarding your visit today- electronically or via mail.      Please help us by completing the survey as this will provide the needed feedback to ensure we are providing the very best care for you and your family.    If you cannot score us a very good on any question, please call the office to discuss how we could have made your experience a very good one.    Thank you.       STAFF:    Madisyn Duffy, Josette Donovan, Cesilia Guillen      CLINICAL STAFF:    Tanya Esteban LPN, Dayan Oseguera LPN, Anabel Canales LPN, Allie Mishra CMA

## 2024-11-25 NOTE — PROGRESS NOTES
day turnaround on questions or calls  may be better served by a neurologist in the area full time.  [facility] at [facility] is available for outpatient visits and procedures including EMG/NCS.  Non-[facility] neurologists also practice in [place] (Dr. Guo) and [place] (Dr's Danner, Benedikt).       Jigar Rossi MD   11/25/2024  4:34 PM

## 2025-02-05 ENCOUNTER — HOSPITAL ENCOUNTER (EMERGENCY)
Age: 40
Discharge: HOME | End: 2025-02-05
Attending: EMERGENCY MEDICINE
Payer: MEDICAID

## 2025-02-05 VITALS
RESPIRATION RATE: 18 BRPM | SYSTOLIC BLOOD PRESSURE: 121 MMHG | BODY MASS INDEX: 29.05 KG/M2 | WEIGHT: 180 LBS | HEART RATE: 91 BPM | OXYGEN SATURATION: 97 % | DIASTOLIC BLOOD PRESSURE: 89 MMHG | TEMPERATURE: 98.1 F

## 2025-02-05 DIAGNOSIS — F41.1 ANXIETY STATE: Primary | ICD-10-CM

## 2025-02-05 PROCEDURE — 6370000000 HC RX 637 (ALT 250 FOR IP): Performed by: EMERGENCY MEDICINE

## 2025-02-05 PROCEDURE — 99283 EMERGENCY DEPT VISIT LOW MDM: CPT

## 2025-02-05 RX ORDER — LORAZEPAM 1 MG/1
1 TABLET ORAL ONCE
Status: COMPLETED | OUTPATIENT
Start: 2025-02-05 | End: 2025-02-05

## 2025-02-05 RX ORDER — LORAZEPAM 1 MG/1
1 TABLET ORAL EVERY 8 HOURS PRN
Qty: 10 TABLET | Refills: 0 | Status: SHIPPED | OUTPATIENT
Start: 2025-02-05 | End: 2025-03-07

## 2025-02-05 RX ORDER — BUPRENORPHINE AND NALOXONE 8; 2 MG/1; MG/1
1 FILM, SOLUBLE BUCCAL; SUBLINGUAL DAILY
COMMUNITY

## 2025-02-05 RX ADMIN — LORAZEPAM 1 MG: 1 TABLET ORAL at 08:50

## 2025-02-05 ASSESSMENT — PAIN - FUNCTIONAL ASSESSMENT: PAIN_FUNCTIONAL_ASSESSMENT: NONE - DENIES PAIN

## 2025-02-05 NOTE — ED PROVIDER NOTES
EMERGENCY DEPARTMENT ENCOUNTER    Pt Name: Chandler Miller  MRN: [id number]  Birthdate 1985  Date of evaluation: 2/5/25  CHIEF COMPLAINT       Chief Complaint   Patient presents with    Anxiety     Ongoing for approx 2 days, reports minimal sleep.      HISTORY OF PRESENT ILLNESS   31-year-old male with a history of anxiety disorder that presents with complaints of anxiety.  Patient states that he has not slept in a couple of days.  Denies any suicidal or homicidal ideation.  Patient states that he has this issue intermittent, he states that he has tried a trazodone, Seroquel and hydroxyzine previously with no improvement.           REVIEW OF SYSTEMS     Review of Systems  PASTMEDICAL HISTORY     Past Medical History:   Diagnosis Date    Anxiety     Depression     Headache     Narcotic abuse (HCC)      Past Problem List  Patient Active Problem List   Diagnosis Code    Lightheadedness R42    Tinnitus of left ear H93.12    Deviated nasal septum J34.2    Abnormal CT scan of head R93.0     SURGICAL HISTORY       Past Surgical History:   Procedure Laterality Date    ADENOIDECTOMY       CURRENT MEDICATIONS       Previous Medications    BUPRENORPHINE-NALOXONE (SUBOXONE) 12-3 MG SUBLINGUAL FILM    1 Film daily.    BUPRENORPHINE-NALOXONE (SUBOXONE) 8-2 MG FILM SL FILM    Place 1 Film under the tongue daily. Max Daily Amount: 1 Film    BUPROPION (WELLBUTRIN XL) 150 MG EXTENDED RELEASE TABLET    1 tablet every morning    BUSPIRONE HCL (BUSPAR PO)    Take by mouth in the morning and at bedtime    ESCITALOPRAM (LEXAPRO) 20 MG TABLET    20 mg daily     MULTIPLE VITAMINS-MINERALS (THERAPEUTIC MULTIVITAMIN-MINERALS) TABLET    Take 1 tablet by mouth daily    PALIPERIDONE (INVEGA) 1.5 MG EXTENDED RELEASE TABLET    Take 1.5 mg by mouth daily      ALLERGIES     has No Known Allergies.  FAMILY HISTORY     He indicated that his mother is alive. He indicated that his father is alive.     SOCIAL HISTORY       Social History